# Patient Record
Sex: FEMALE | Race: WHITE | Employment: FULL TIME | URBAN - METROPOLITAN AREA
[De-identification: names, ages, dates, MRNs, and addresses within clinical notes are randomized per-mention and may not be internally consistent; named-entity substitution may affect disease eponyms.]

---

## 2021-03-17 ENCOUNTER — HOSPITAL ENCOUNTER (OUTPATIENT)
Dept: LAB | Age: 41
Discharge: HOME | End: 2021-03-17

## 2021-03-17 LAB — SENTARA SPECIMEN COL,SENBCF: NORMAL

## 2021-03-17 PROCEDURE — 99001 SPECIMEN HANDLING PT-LAB: CPT

## 2022-01-26 ENCOUNTER — HOSPITAL ENCOUNTER (OUTPATIENT)
Dept: PHYSICAL THERAPY | Age: 42
Discharge: HOME | End: 2022-01-26
Payer: COMMERCIAL

## 2022-01-26 PROCEDURE — 97110 THERAPEUTIC EXERCISES: CPT

## 2022-01-26 PROCEDURE — 97166 OT EVAL MOD COMPLEX 45 MIN: CPT

## 2022-01-26 NOTE — PROGRESS NOTES
[facility]  [address], [address] [facility] [place]  Ph [phone]  Fx [phone]    Plan of Care/Statement of Necessity for Occupational Therapy Services    Patient name: Jasmina Whittaker Start of Care: 2022   Referral source: Breefield Carmela DO : 1980    Medical Diagnosis: Left arm pain    Onset Date:    Treatment Diagnosis: Other abnormal involuntary movements [R25.8]  Tremor, unspecified [R25.1]   Prior Hospitalization: see medical history Provider#: [id number]   Medications: Verified on Patient summary List    Comorbidities: Osteoporosis, thyroid problems, kidney transplant, complete throidectomy   Prior Level of Function: Pt independent in all self care, working full time in . The Plan of Care and following information is based on the information from the initial evaluation. Assessment/ key information: Pt presents with decreased independent and performance in ADL/IADL activiites and work tasks 2/2 functional tremor, hand weakness, decreased coodination with the left hand and arm. Pt gross functional strength intact, has learned motor compensatory thumb MP hyperextension to decrease thumb tremor when thumb is in extension that is making fine motor tasks difficult and weak extensors along dorsum of hand and arm. Pt does have tremor at rest which may be presenting as essential vs funcitonal tremor however today is a \"good day\" for patient so it is hard to tell upon evaluation.  Pt may have underlying neurological impairment that is main cause, however pt would benefit from skilled OT services to work on increasing ADL independence, strength, fine motor tasks.      Patient will continue to benefit from skilled OT services to modify and progress therapeutic interventions, address functional mobility deficits, address strength deficits, analyze and cue movement patterns, analyze and modify body mechanics/ergonomics, assess and modify postural abnormalities and instruct in home and community integration to attain remaining goals. Evaluation Complexity: History MEDIUM Complexity : Expanded review of history including physical, cognitive and psychosocial  history  Examination MEDIUM Complexity : 3-5 performance deficits relating to physical, cognitive , or psychosocial skils that result in activity limitations and / or participation restrictions Clinical Decision Making MEDIUM Complexity : Patient may present with comorbidities that affect occupational performnce. Miniml to moderate modification of tasks or assistance (eg, physical or verbal ) with assesment(s) is necessary to enable patient to complete evaluation   FOTO score = an established functional score where 100 = no disability  Overall Complexity Rating: MEDIUM    Problem List: Decreased strength, Decreased coordination/prehension, Decreased ADL/functional abilities , Decreased activity tolerance, Decreased flexibility/joint mobility and Sensability   Treatment Plan may include any combination of the following: Therapeutic exercise, Therapeutic activities, Neuromuscular re-education, Physical agent/modality, Manual therapy, Patient education and ADLs/IADLs    Patient / Family readiness to learn indicated by: asking questions, trying to perform skills and interest  Persons(s) to be included in education:   patient (P)  Barriers to Learning/Limitations: None    Patient Goal (s): I want to get techniques to get better  Patient Self Reported Health Status: fair  Rehabilitation Potential: fair    ST Weeks   1. Pt will be able to perform ball rotation activity without elbow support with less than 2 drops to improve fine motor skills     Eval status: 5 drops   2. Pt will be able to improve MMT of thumb extension to 4+/5 to improve thumb stability during functional grasp  Eval status. 3/5  3.  Pt will be able to button jacket button in less than 30 seconds  with adaptive techniques and proximal support to improve ADL dressing tasks  Eval status: Takes 1 minute      LT weeks   1. Pt will be able to identify 3 adaptive techniques that assist with tremors during functional activity   Eval status: Able to name one  2. Pt will be able to 15# box and lift with proper body mechanics to mimic a functional work task with self recognition of poor form  Eval status: unaware of thumb collapse, has hard time with over 10#      3. Patient will improve FOTO score by 10 points to improve functional tolerance for ADL/IADL and work tasks. Eval Status: FOTO will be obtained  FOTO score = an established functional score where 100 = no disability    Frequency / Duration: Patient to be seen 2 times per week for 4 weeks:    Patient/ Caregiver education and instruction: Diagnosis, prognosis, self care, activity modification and exercises    Certification Period: 22-22     Farhad DAVIS, OTR/L  2022 8:00 AM    ______________________________________________________________________    I certify that the above Therapy Services are being furnished while the patient is under my care. I agree with the treatment plan and certify that this therapy is necessary.     Physician's Signature:_________________________Date:_________TIME:________                                       Ravi Talley DO      ** Signature, Date and Time must be completed for valid certification **  Please sign and return to [facility]  [address], [address] [facility] [place]  Ph [phone]  Fx [phone]

## 2022-01-26 NOTE — PROGRESS NOTES
OT DAILY TREATMENT NOTE/HAND EVAL 10-18    Patient Name: Lisa Dockery  Date:2022  : 1980  [x]  Patient  Verified  Payor: [address] [place],9D / Plan: [address] / Product Type: Managed Care Medicare /    In time:419  Out time:506  Total Treatment Time (min): 45  Visit #: 1 16    Medicare/BCBS Only   Total Timed Codes (min):  45 1:1 Treatment Time:  45       Treatment Area: Other abnormal involuntary movements [R25.8]  Tremor, unspecified [R25.1]    SUBJECTIVE  Pain Level (0-10 scale): 0/10  []constant []intermittent []improving []worsening [x]no change since onset    Any medication changes, allergies to medications, adverse drug reactions, diagnosis change, or new procedure performed?: [x] No    [] Yes (see summary sheet for update)  Subjective functional status/changes:     PLOF: Pt reports independence in self care tasks; pt is a right hand dominant  Limitations to PLOF: Tremor during functional tasks, weakness feeling during ADLs, numbness in the finger tips  Mechanism of Injury: Progresive over time, no acute incident, no falls, no surgical procedures can be linked to tremor  Current symptoms/Complaints: Tremors during functional tasks, weakness in the hands, hard time buttoning buttons, diffulty with work tasks, trouble sleeping because of resting tremors. 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  Living Situation: Lives at home with family   Pt Goals:  Ot get techniques to make tremor better  Barriers: []pain []financial [x]time []transportation []other  Motivation: Pt is highly motived  Cognition: A & O x 4    Other:    OBJECTIVE    20 min [x]Eval                  []Re-Eval       25 min Therapeutic Exercise:  [x] See flow sheet :   Rationale: increase ROM, increase strength, improve coordination and increase proprioception to improve the patients ability to perform fine motor tasks, improve dexterity, improve coordination               With   [x] TE   [] TA   [] neuro   [] other: Patient Education: [x] Review HEP    [] Progressed/Changed HEP based on:   [] positioning   [] body mechanics   [] transfers   [] heat/ice application    [] other:        General Evaluation                                                 Shoulder MMT         Right            Left  Flx                               5/5            5/5                           Ext                              5/5             5/5                         Abd                               5/5          5/5                    ER                                 5/5 4/5                   IR                                   5/5        5/5                         Tests:                      Right          Left  Tinels median           -                    -  Tinels ulnar                 -                   -  CMC grind                  -                  +                                                                                                                                                               Elbow MMT    Right             Left  Flx/Ext                 5/5            5/5                                  Pro                      5/5              5/5                   Sup                                                                   Wrist AROM       Right           Left   Flx                          5/5            5/5                              Ext                          5/5            3+5                               UD                         5/5            5/5                               RD                       5/5              5/5                                                                                                Finger:  WNL                                                                                                        Right                  Left  1st                         80 60                                                      2nd                         65                                        60                3rd               65                      60                     Pinch     Right          Left  3pt            19              16                        2pt             16            18                                                                             Sensation: Pts has previous neuropathy symptoms, has negative tinels but does has pin prick sensation loss through median nerve distribution. Level 3.36 on semmes jazmín filament     Observation:   Pt does have tremor at rest and with function primarily impacting the thumb and index finger. Pt has CMC thumb collapse compensatory pattern with fine motor tasks with weak extensors. Resting position of wrist in slight flexion with finger extension attempting to compensate. During fine motor tasks pt initates motion at tumb MP   Palpation:   No pain with palpation through forearm, no muscle wasting in thenar or hypothenar regions.        Nine-Hole Peg Test:  Left= _24.5____seconds  Right=_29.5____seconds    ADLs  Feeding:        []MaxA   []ModA   []Frank   [] CGA   []SBA   [x]Yfn   []Independent  UE Dressing:       []MaxA   []ModA   []Frank   [] CGA   []SBA   []Yfn   [x]Independent  LE Dressing:       []MaxA   []ModA   []Frank   [] CGA   []SBA   []Yfn   [x]Independent  Grooming:       []MaxA   []ModA   []Frank   [] CGA   []SBA   []Yfn   [x]Independent  Toileting:       []MaxA   []ModA   []Frank   [] CGA   []SBA   []Yfn   [x]Independent  Bathing:       []MaxA   []ModA   []Frank   [] CGA   []SBA   []Yfn   [x]Independent  Light Meal Prep:    []MaxA   []ModA   []Frank   [] CGA   []SBA   [x]Yfn   []Independent  Household/Other: []MaxA   []ModA   []Frank   [] CGA   []SBA   [x]Yfn   []Independent  Adaptive Equip:     []MaxA   []ModA   []Frank   [] CGA   []SBA   []Yfn [x]Independent  Driving:       []MaxA   []ModA   []Frank   [] CGA   []SBA   []Yfn   [x]Independent  Money Mgmt:        []MaxA   []ModA   []Frank   [] CGA   []SBA   []Yfn   [x]Independent    Pain Level (0-10 scale) post treatment: 0/10    ASSESSMENT/Changes in Function: Pt presents with decreased independent and performance in ADL/IADL activiites and work tasks 2/2 functional tremor, hand weakness, decreased coodination with the left hand and arm. Pt gross functional strength intact, has learned motor compensatory thumb MP hyperextension to decrease thumb tremor when thumb is in extension that is making fine motor tasks difficult and weak extensors along dorsum of hand and arm. Pt does have tremor at rest which may be presenting as essential vs funcitonal tremor however today is a \"good day\" for patient so it is hard to tell upon evaluation. Pt may have underlying neurological impairment that is main cause, however pt would benefit from skilled OT services to work on increasing ADL independence, strength, fine motor tasks. Patient will continue to benefit from skilled OT services to modify and progress therapeutic interventions, address functional mobility deficits, address strength deficits, analyze and cue movement patterns, analyze and modify body mechanics/ergonomics, assess and modify postural abnormalities and instruct in home and community integration to attain remaining goals. [x]  See Plan of Care  []  See progress note/recertification  []  See Discharge Summary         Goals   ST Weeks   1. Pt will be able to perform ball rotation activity without elbow support with less than 2 drops to improve fine motor skills     Eval status: 5 drops   2. Pt will be able to improve MMT of thumb extension to 4+/5 to improve thumb stability during functional grasp  Eval status. 3/5  3.  Pt will be able to button jacket button in less than 30 seconds  with adaptive techniques and proximal support to improve ADL dressing tasks  Eval status: Takes 1 minute     LT weeks   1. Pt will be able to identify 3 adaptive techniques that assist with tremors during functional activity   Eval status: Able to name one  2. Pt will be able to 15# box and lift with proper body mechanics to mimic a functional work task with self recognition of poor form  Eval status: unaware of thumb collapse, has hard time with over 10#     3. Patient will improve FOTO score by 10 points to improve functional tolerance for ADL/IADL and work tasks.   Eval Status: FOTO will be obtained  FOTO score = an established functional score where 100 = no disability      PLAN  [x]  Upgrade activities as tolerated     [x]  Continue plan of care  []  Update interventions per flow sheet       []  Discharge due to:_  []  Other:_      Kirsten DAVIS, OTR/L  2022  8:00 AM

## 2022-02-02 ENCOUNTER — HOSPITAL ENCOUNTER (OUTPATIENT)
Dept: PHYSICAL THERAPY | Age: 42
Discharge: HOME | End: 2022-02-02
Payer: COMMERCIAL

## 2022-02-02 PROCEDURE — 97110 THERAPEUTIC EXERCISES: CPT

## 2022-02-02 PROCEDURE — 97112 NEUROMUSCULAR REEDUCATION: CPT

## 2022-02-02 PROCEDURE — 97530 THERAPEUTIC ACTIVITIES: CPT

## 2022-02-02 NOTE — PROGRESS NOTES
OT DAILY TREATMENT NOTE - [facility]     Patient Name: Benny Sheppard  Date:2022  : 1980  [x]  Patient  Verified  Payor: Jerald Mistry / Plan: [facility] [address] / Product Type: Managed Care Medicare /    In time:540  Out time:635  Total Treatment Time (min): 55  Total Timed Codes (min): 55  1:1 Treatment Time ([address] only): 55   Visit #: 2 of 8    Treatment Area: Other abnormal involuntary movements [R25.8]  Tremor, unspecified [R25.1]    SUBJECTIVE  Pain Level (0-10 scale): 3/10  Any medication changes, allergies to medications, adverse drug reactions, diagnosis change, or new procedure performed?: [x] No    [] Yes (see summary sheet for update)  Subjective functional status/changes:   [] No changes reported  Pt report noticing thumb position more throughout the week, was able to decrease compensatory pattern.      OBJECTIVE        25 min Therapeutic Exercise:  [x] See flow sheet :   Rationale: increase strength to improve the patients ability to proximal strength in the forearm and elbow and shoulder     18 min Therapeutic Activity:  [x]  See flow sheet :   Rationale: improve coordination and improve balance  to improve the patients ability to improve thumb stability during functional tasks      12 min Neuromuscular Re-education:  [x]  See flow sheet :   Rationale: increase proprioception  to improve the patients ability to decrease neural tension through thumb, fingers, and shoulder/upper extremity          With   [x] TE   [] TA   [] neuro   [] other: Patient Education: [x] Review HEP    [x] Progressed/Changed HEP based on:   [x] positioning   [] body mechanics   [] transfers   [] heat/ice application   [] Splint wear/care   [] Sensory re-education   [] scar management      [] other:                  Pain Level (0-10 scale) post treatment: 0/10    ASSESSMENT/Changes in Function: Pt made improvements over the past week on thumb positioning during functional  and grasp, has better IP flexion during grasping patterns and extension patterns of the thumb. Addition of proximal strengthening with scapular strengthening tolerated well, also addiction of neural gliding to address median nerve irritation. Pt remains a good candidate for skilled OT services to address functional tremors and decrease pain and tingling in the hands. Patient will continue to benefit from skilled OT services to modify and progress therapeutic interventions, address functional mobility deficits, address ROM deficits, address strength deficits, analyze and address soft tissue restrictions, analyze and cue movement patterns, analyze and modify body mechanics/ergonomics and assess and modify postural abnormalities to attain remaining goals. [x]  See Plan of Care  []  See progress note/recertification  []  See Discharge Summary         Progress towards goals / Updated goals:  ST Weeks   1. Pt will be able to perform ball rotation activity without elbow support with less than 2 drops to improve fine motor skills     Eval status: 5 drops   2. Pt will be able to improve MMT of thumb extension to 4+/5 to improve thumb stability during functional grasp  Eval status.  3/5  3. Pt will be able to button jacket button in less than 30 seconds  with adaptive techniques and proximal support to improve ADL dressing tasks  Eval status: Takes 1 minute      LT weeks   1. Pt will be able to identify 3 adaptive techniques that assist with tremors during functional activity   Eval status: Able to name one  2. Pt will be able to 15# box and lift with proper body mechanics to mimic a functional work task with self recognition of poor form  Eval status: unaware of thumb collapse, has hard time with over 10#      3. Patient will improve FOTO score by 10 points to improve functional tolerance for ADL/IADL and work tasks.   Eval Status: FOTO 53  FOTO score = an established functional score where 100 = no disability    PLAN  [x] Upgrade activities as tolerated     [x]  Continue plan of care  []  Update interventions per flow sheet       []  Discharge due to:_  []  Other:_      Blair Ramey OTD, OTR/L  2/2/2022  8:02 AM    Future Appointments   Date Time Provider Erik Tsang   2/2/2022  5:45 PM Maycol Jackson [facility]   2/4/2022  8:00 AM [facility]   2/8/2022  8:45 AM Maycol Jackson [facility]   2/10/2022  8:00 AM [facility]   2/17/2022  8:00 AM [facility]   2/18/2022  8:45 AM [facility]   2/23/2022  5:45 PM [facility]   2/25/2022  8:45 AM Maycol Jackson [facility]

## 2022-02-04 ENCOUNTER — APPOINTMENT (OUTPATIENT)
Dept: PHYSICAL THERAPY | Age: 42
End: 2022-02-04
Payer: COMMERCIAL

## 2022-02-08 ENCOUNTER — HOSPITAL ENCOUNTER (OUTPATIENT)
Dept: PHYSICAL THERAPY | Age: 42
Discharge: HOME | End: 2022-02-08
Payer: COMMERCIAL

## 2022-02-08 PROCEDURE — 97112 NEUROMUSCULAR REEDUCATION: CPT

## 2022-02-08 PROCEDURE — 97530 THERAPEUTIC ACTIVITIES: CPT

## 2022-02-08 PROCEDURE — 97110 THERAPEUTIC EXERCISES: CPT

## 2022-02-08 NOTE — PROGRESS NOTES
OT DAILY TREATMENT NOTE - [facility]     Patient Name: Leandro Zepeda  Date:2022  : 1980  [x]  Patient  Verified  Payor: Jay [employer]ocks / Plan: [employer]/POS / Product Type: HMO /    In time:834  Out time: 930    Total Treatment Time (min): 56  Total Timed Codes (min): 56  1:1 Treatment Time ([address] only): 56   Visit #: 3 of 8    Treatment Area: Other abnormal involuntary movements [R25.8]  Tremor, unspecified [R25.1]    SUBJECTIVE  Pain Level (0-10 scale): 3/10  Any medication changes, allergies to medications, adverse drug reactions, diagnosis change, or new procedure performed?: [x] No    [] Yes (see summary sheet for update)  Subjective functional status/changes:   [] No changes reported  Pt reports no soreness following last session    OBJECTIVE      30 min Therapeutic Exercise:  [x] See flow sheet :   Rationale: increase ROM and increase strength to improve the patients ability to proximal strength in the forearm and elbow and shoulder     18 min Therapeutic Activity:  [x]  See flow sheet :   Rationale: increase ROM and increase strength  to improve the patients ability to improve thumb stability during functional tasks      8 min Neuromuscular Re-education:  [x]  See flow sheet :   Rationale: increase ROM and increase strength  to improve the patients ability to neural tension through thumb, fingers, and shoulder/upper extremity     With   [x] TE   [] TA   [] neuro   [] other: Patient Education: [x] Review HEP    [] Progressed/Changed HEP based on:   [] positioning   [] body mechanics   [] transfers   [] heat/ice application   [] Splint wear/care   [] Sensory re-education   [] scar management      [] other:         Pain Level (0-10 scale) post treatment: 2/10    ASSESSMENT/Changes in Function: Pt doing well, had no soreness with advancement of activity today.  Upgraded fine motor pinching/control tasks today, pt does have noted tremors when getting closer to placing peg into hole, proximal stability does not seem to effect rate of tremor at forearm or elbow level at the moment. Continue with verbal cues to prevent cmc hyperextension for better support using thenar muscles. Better picture given so that pt can better follow median nerve glides as she has trouble with instructions. Patient will continue to benefit from skilled OT services to modify and progress therapeutic interventions, address functional mobility deficits, address ROM deficits, address strength deficits, analyze and address soft tissue restrictions, analyze and cue movement patterns and analyze and modify body mechanics/ergonomics to attain remaining goals. [x]  See Plan of Care  []  See progress note/recertification  []  See Discharge Summary         Progress towards goals / Updated goals:  ST Weeks   1. Pt will be able to perform ball rotation activity without elbow support with less than 2 drops to improve fine motor skills     Eval status: 5 drops   2. Pt will be able to improve MMT of thumb extension to 4+/5 to improve thumb stability during functional grasp  Eval status.  3/5  3. Pt will be able to button jacket button in less than 30 seconds  with adaptive techniques and proximal support to improve ADL dressing tasks  Eval status: Takes 1 minute      LT weeks   1. Pt will be able to identify 3 adaptive techniques that assist with tremors during functional activity   Eval status: Able to name one  2. Pt will be able to 15# box and lift with proper body mechanics to mimic a functional work task with self recognition of poor form  Eval status: unaware of thumb collapse, has hard time with over 10#      3. Patient will improve FOTO score by 10 points to improve functional tolerance for ADL/IADL and work tasks.   Eval Status: FOTO 53  FOTO score = an established functional score where 100 = no disability    PLAN  [x]  Upgrade activities as tolerated     [x]  Continue plan of care  [x]  Update interventions per flow sheet       []  Discharge due to:_  []  Other:_      Albertina Martinez OTARTHUR, OTR/L  2/8/2022  7:51 AM    Future Appointments   Date Time Provider Erik Tsang   2/8/2022  8:45 AM Aruna Valdovinos [facility]   2/10/2022  8:00 AM [facility]   2/17/2022  8:00 AM [facility]   2/18/2022  8:45 AM [facility]   2/23/2022  5:45 PM [facility]   2/25/2022  8:45 AM Aruna Valdovinos [facility]

## 2022-02-10 ENCOUNTER — HOSPITAL ENCOUNTER (OUTPATIENT)
Dept: PHYSICAL THERAPY | Age: 42
Discharge: HOME | End: 2022-02-10
Payer: COMMERCIAL

## 2022-02-10 PROCEDURE — 97530 THERAPEUTIC ACTIVITIES: CPT

## 2022-02-10 PROCEDURE — 97110 THERAPEUTIC EXERCISES: CPT

## 2022-02-10 NOTE — PROGRESS NOTES
OT DAILY TREATMENT NOTE - [facility]     Patient Name: Leandro Zepeda  Date:2/10/2022  : 1980  [x]  Patient  Verified  Payor: Jay Socks / Plan: [facility] [employer] HMO/CHOICE PLUS/POS / Product Type: HMO /    In time:821  Out time:915  Total Treatment Time (min): 56  Total Timed Codes (min): 56  1:1 Treatment Time ([address] only): 56   Visit #: 4 of 8    Treatment Area: Other abnormal involuntary movements [R25.8]  Tremor, unspecified [R25.1]    SUBJECTIVE  Pain Level (0-10 scale): 7/10  Any medication changes, allergies to medications, adverse drug reactions, diagnosis change, or new procedure performed?: [x] No    [] Yes (see summary sheet for update)  Subjective functional status/changes:   [] No changes reported  PT reports neuropathy pain is bad today, reports thumb is not increase in shaking however pain is not feeling great. OBJECTIVE    29 min Therapeutic Exercise:  [x] See flow sheet :   Rationale: increase ROM and increase strength to improve the patients ability to proximal strength in the forearm and elbow and shoulder     27 min Therapeutic Activity:  [x]  See flow sheet :   Rationale: increase ROM, increase strength, improve coordination and increase proprioception  to improve the patients ability to improve thumb stability during functional tasks            With   [x] TE   [] TA   [] neuro   [] other: Patient Education: [x] Review HEP    [x] Progressed/Changed HEP based on:   [] positioning   [] body mechanics   [] transfers   [] heat/ice application   [] Splint wear/care   [] Sensory re-education   [] scar management      [] other:                Pain Level (0-10 scale) post treatment: 6/10    ASSESSMENT/Changes in Function: Pt doing well and demonstrated decrease tremor during pinch activities with peg placement and pt beginning to have increased confidence. Neuropathy pain continues to fluctuate day to day.  Upgraded pinching activities at home, pt doing better with IP flexion during functional tasks. Patient will continue to benefit from skilled OT services to modify and progress therapeutic interventions, address functional mobility deficits, address ROM deficits, address strength deficits, analyze and address soft tissue restrictions, analyze and cue movement patterns and analyze and modify body mechanics/ergonomics to attain remaining goals. Patient will continue to benefit from skilled OT services to modify and progress therapeutic interventions, address functional mobility deficits, address ROM deficits, address strength deficits, analyze and address soft tissue restrictions, analyze and cue movement patterns, analyze and modify body mechanics/ergonomics and assess and modify postural abnormalities to attain remaining goals. [x]  See Plan of Care  []  See progress note/recertification  []  See Discharge Summary         Progress towards goals / Updated goals:  ST Weeks   1. Pt will be able to perform ball rotation activity without elbow support with less than 2 drops to improve fine motor skills     Eval status: 5 drops   Current:   2. Pt will be able to improve MMT of thumb extension to 4+/5 to improve thumb stability during functional grasp  Eval status.  3/5   Current:   3. Pt will be able to button jacket button in less than 30 seconds  with adaptive techniques and proximal support to improve ADL dressing tasks  Eval status: Takes 1 minute    Current:    LT weeks   1. Pt will be able to identify 3 adaptive techniques that assist with tremors during functional activity   Eval status: Able to name one   Current:   2. Pt will be able to 15# box and lift with proper body mechanics to mimic a functional work task with self recognition of poor form  Eval status: unaware of thumb collapse, has hard time with over 10#     Current:   3. Patient will improve FOTO score by 10 points to improve functional tolerance for ADL/IADL and work tasks.   Eval Status: FOTO 53  FOTO score = an established functional score where 100 = no disability  PLAN  [x]  Upgrade activities as tolerated     [x]  Continue plan of care  []  Update interventions per flow sheet       []  Discharge due to:_  []  Other:_      Edi Galvan OTD, OTR/L  2/10/2022  7:49 AM    Future Appointments   Date Time Provider Erik Tsang   2/10/2022  8:00 AM Aljunaid Orellana [facility]   2/17/2022  8:00 AM Aljunaid Orellana [facility]   2/18/2022  8:45 AM [facility]   2/23/2022  5:45 PM [facility]   2/25/2022  8:45 AM Sandrine Orellana [facility]

## 2022-02-17 ENCOUNTER — HOSPITAL ENCOUNTER (OUTPATIENT)
Dept: PHYSICAL THERAPY | Age: 42
Discharge: HOME | End: 2022-02-17
Payer: COMMERCIAL

## 2022-02-17 PROCEDURE — 97530 THERAPEUTIC ACTIVITIES: CPT

## 2022-02-17 PROCEDURE — 97110 THERAPEUTIC EXERCISES: CPT

## 2022-02-17 NOTE — PROGRESS NOTES
OT DAILY TREATMENT NOTE - [facility]     Patient Name: Sangeetha Zavala  Date:2022  : 1980  [x]  Patient  Verified  Payor: Alfonso Cruzs / Plan: [employer] HMO/CHOICE PLUS/POS / Product Type: HMO /    In time:758  Out time:845  Total Treatment Time (min): 47  Total Timed Codes (min): 47  1:1 Treatment Time ( [address] only): 47   Visit #: 5 of 8    Treatment Area: Other abnormal involuntary movements [R25.8]  Tremor, unspecified [R25.1]    SUBJECTIVE  Pain Level (0-10 scale): 5/10  Any medication changes, allergies to medications, adverse drug reactions, diagnosis change, or new procedure performed?: [x] No    [] Yes (see summary sheet for update)  Subjective functional status/changes:   [] No changes reported  Pt reports having some \"jerks\" in the night making it hard to sleep, reports compliance with exercises and working on thumb stablily. OBJECTIVE       14 min Therapeutic Exercise:  [x] See flow sheet :   Rationale: increase ROM, increase strength and improve coordination to improve the patients ability to proximal strength in the forearm and elbow and shoulder     33 min Therapeutic Activity:  [x]  See flow sheet :   Rationale: increase ROM, increase strength and improve coordination  to improve the patients ability to improve the patients ability to improve thumb stability during functional tasks      With   [x] TE   [] TA   [] neuro   [] other: Patient Education: [x] Review HEP    [x] Progressed/Changed HEP based on:   [] positioning   [] body mechanics   [] transfers   [] heat/ice application   [] Splint wear/care   [] Sensory re-education   [] scar management      [] other:                Pain Level (0-10 scale) post treatment: 4/10    ASSESSMENT/Changes in Function: Pt steadily progressing, main limitation currently seems to be pain due to her neuropathy which she has been fatiguing for her.  Proximal strengthening of shoulders and forearm are decreasing functional tremors of the thumb, and increasing strength of thumb cmc stabilizers seems to have made peg placement against resistance better. Needs some cuing to bring arm into side or to rest on table when tremors increase. Patient will continue to benefit from skilled OT services to modify and progress therapeutic interventions, address functional mobility deficits, address ROM deficits, address strength deficits, analyze and address soft tissue restrictions, analyze and cue movement patterns, analyze and modify body mechanics/ergonomics and assess and modify postural abnormalities to attain remaining goals. [x]  See Plan of Care  []  See progress note/recertification  []  See Discharge Summary         Progress towards goals / Updated goals:  ST Weeks   1. Pt will be able to perform ball rotation activity without elbow support with less than 2 drops to improve fine motor skills     Eval status: 5 drops   Current:   2. Pt will be able to improve MMT of thumb extension to 4+/5 to improve thumb stability during functional grasp  Eval status.  3/5   Current:   3. Pt will be able to button jacket button in less than 30 seconds  with adaptive techniques and proximal support to improve ADL dressing tasks  Eval status: Takes 1 minute    Current:    LT weeks   1. Pt will be able to identify 3 adaptive techniques that assist with tremors during functional activity   Eval status: Able to name one   Current:   2. Pt will be able to 15# box and lift with proper body mechanics to mimic a functional work task with self recognition of poor form  Eval status: unaware of thumb collapse, has hard time with over 10#     Current:   3. Patient will improve FOTO score by 10 points to improve functional tolerance for ADL/IADL and work tasks.   Eval Status: FOTO 53  FOTO score = an established functional score where 100 = no disability    PLAN  [x]  Upgrade activities as tolerated     [x]  Continue plan of care  []  Update interventions per flow sheet       []  Discharge due to:_  []  Other:_      Ceasar Soriano OTD, OTR/L  2/17/2022  7:44 AM    Future Appointments   Date Time Provider Erik Tsang   2/17/2022  8:00 AM Cameron Aragon [facility]   2/18/2022  8:45 AM [facility]   2/23/2022  5:45 PM [facility]   2/25/2022  8:45 AM Cameron Aragon [facility]

## 2022-02-18 ENCOUNTER — HOSPITAL ENCOUNTER (OUTPATIENT)
Dept: PHYSICAL THERAPY | Age: 42
Discharge: HOME | End: 2022-02-18
Payer: COMMERCIAL

## 2022-02-18 PROCEDURE — 97110 THERAPEUTIC EXERCISES: CPT

## 2022-02-18 PROCEDURE — 97530 THERAPEUTIC ACTIVITIES: CPT

## 2022-02-18 NOTE — PROGRESS NOTES
OT DAILY TREATMENT NOTE - [facility]     Patient Name: Anuel Jacome  Date:2022  : 1980  [x]  Patient  Verified  Payor: Lul Mccormack / Plan: [facility] HMO/CHOICE PLUS/POS / Product Type: HMO /    In time:843  Out time:930  Total Treatment Time (min): 47  Total Timed Codes (min): 47  1:1 Treatment Time ( [address] only): 47   Visit #: 6 of 8    Treatment Area: Other abnormal involuntary movements [R25.8]  Tremor, unspecified [R25.1]    SUBJECTIVE  Pain Level (0-10 scale): 0/10  Any medication changes, allergies to medications, adverse drug reactions, diagnosis change, or new procedure performed?: [x] No    [] Yes (see summary sheet for update)  Subjective functional status/changes:   [] No changes reported  Pt reports being minor sore in the thumb following yesterdays execises   OBJECTIVE        12 min Therapeutic Exercise:  [x] See flow sheet :   Rationale: increase ROM, increase strength and improve coordination to improve the patients ability to patients ability to proximal strength in the forearm and elbow and shoulder     35 min Therapeutic Activity:  [x]  See flow sheet :   Rationale: increase ROM, increase strength and improve coordination  to improve the patients ability to patients ability to improve thumb stability during functional tasks      With   [x] TE   [] TA   [] neuro   [] other: Patient Education: [x] Review HEP    [x] Progressed/Changed HEP based on:   [] positioning   [] body mechanics   [] transfers   [] heat/ice application   [] Splint wear/care   [] Sensory re-education   [] scar management      [] other:                 Pain Level (0-10 scale) post treatment: 0/10    ASSESSMENT/Changes in Function: Pt doing well, no adverse effect from yesterday session. Focus on fine motor placement and thumb flexion strengthening today. Upgraded pinch activities today with no drops from patient even with upgraded resistance. Continue to work on proximal stability and strengthening leading to increased distal control. Pt remains a good candidate for skilled OT services. Patient will continue to benefit from skilled OT services to modify and progress therapeutic interventions, address functional mobility deficits, address ROM deficits, address strength deficits, analyze and address soft tissue restrictions, analyze and cue movement patterns, analyze and modify body mechanics/ergonomics and assess and modify postural abnormalities to attain remaining goals. [x]  See Plan of Care  []  See progress note/recertification  []  See Discharge Summary         Progress towards goals / Updated goals:  ST Weeks   1. Pt will be able to perform ball rotation activity without elbow support with less than 2 drops to improve fine motor skills     Eval status: 5 drops   Current: 0 drops  2. Pt will be able to improve MMT of thumb extension to 4+/5 to improve thumb stability during functional grasp  Eval status.  3/5   Current:   3. Pt will be able to button jacket button in less than 30 seconds  with adaptive techniques and proximal support to improve ADL dressing tasks  Eval status: Takes 1 minute    Current:    LT weeks   1. Pt will be able to identify 3 adaptive techniques that assist with tremors during functional activity   Eval status: Able to name one   Current:  Able to name 3   2. Pt will be able to 15# box and lift with proper body mechanics to mimic a functional work task with self recognition of poor form  Eval status: unaware of thumb collapse, has hard time with over 10#     Current:   3. Patient will improve FOTO score by 10 points to improve functional tolerance for ADL/IADL and work tasks.   Eval Status: FOTO 53  FOTO score = an established functional score where 100 = no disability     PLAN  [x]  Upgrade activities as tolerated     [x]  Continue plan of care  []  Update interventions per flow sheet       []  Discharge due to:_  []  Other:_      Cassi Crowe OTD, OTR/L 2/18/2022  7:42 AM    Future Appointments   Date Time Provider Erik Tsang   2/18/2022  8:45 AM [facility]   2/23/2022  5:45 PM [facility]   2/25/2022  8:45 AM Alysha Elizondo THE [facility]

## 2022-02-23 ENCOUNTER — HOSPITAL ENCOUNTER (OUTPATIENT)
Dept: PHYSICAL THERAPY | Age: 42
Discharge: HOME | End: 2022-02-23
Payer: COMMERCIAL

## 2022-02-23 PROCEDURE — 97530 THERAPEUTIC ACTIVITIES: CPT

## 2022-02-23 PROCEDURE — 97110 THERAPEUTIC EXERCISES: CPT

## 2022-02-23 NOTE — PROGRESS NOTES
OT DAILY TREATMENT NOTE - [facility]     Patient Name: Ana Swift  Date:2022  : 1980  [x]  Patient  Verified  Payor: Zulma Driscoll / Plan: [employer] HMO/CHOICE PLUS/POS / Product Type: HMO /    In time:545  Out time:630  Total Treatment Time (min): 45  Total Timed Codes (min): 45  1:1 Treatment Time ([address] only): 45  Visit #: 7 of 8    Treatment Area: Other abnormal involuntary movements [R25.8]  Tremor, unspecified [R25.1]    SUBJECTIVE  Pain Level (0-10 scale): 3/10  Any medication changes, allergies to medications, adverse drug reactions, diagnosis change, or new procedure performed?: [x] No    [x] Yes (see summary sheet for update)  Subjective functional status/changes:   [] No changes reported  Pt reports Saturday was the first day she has not had tremors in over a year and was extremely excited. Has had less issues over the weekend as well     OBJECTIVE      13 min Therapeutic Exercise:  [x] See flow sheet :   Rationale: increase ROM, increase strength and improve coordination to improve the patients ability to proximal strength in the forearm and elbow and shoulder     32 min Therapeutic Activity:  [x]  See flow sheet :   Rationale: increase ROM, increase strength and improve coordination  to improve the patients ability to improve thumb stability during functional tasks           With   [x] TE   [] TA   [] neuro   [] other: Patient Education: [x] Review HEP    [] Progressed/Changed HEP based on:   [] positioning   [] body mechanics   [] transfers   [] heat/ice application   [] Splint wear/care   [] Sensory re-education   [] scar management      [] other:             Pain Level (0-10 scale) post treatment: 0/10    ASSESSMENT/Changes in Function: Pt has progressed well and this is first day where she reports she has not had extreme functional shakes with left hand since Saturday.  Pt is highly excited and is performing resistive fine motor tasks in clinic setting with decreased stress, increased timing, decreased drops. We continue to work on combination or proximal strengthening with fine motor control and stretching to address full body. Patient will continue to benefit from skilled OT services to modify and progress therapeutic interventions, address functional mobility deficits, address ROM deficits, address strength deficits, analyze and address soft tissue restrictions, analyze and cue movement patterns, analyze and modify body mechanics/ergonomics and assess and modify postural abnormalities to attain remaining goals. [x]  See Plan of Care  []  See progress note/recertification  []  See Discharge Summary         Progress towards goals / Updated goals:  ST Weeks   1. Pt will be able to perform ball rotation activity without elbow support with less than 2 drops to improve fine motor skills     Eval status: 5 drops   Current: 0 drops Goal met   2. Pt will be able to improve MMT of thumb extension to 4+/5 to improve thumb stability during functional grasp  Eval status.  3/5   Current:  5/5 Goal met   3. Pt will be able to button jacket button in less than 30 seconds  with adaptive techniques and proximal support to improve ADL dressing tasks  Eval status: Takes 1 minute    Current:  Able to perform with let hand  LT weeks   1. Pt will be able to identify 3 adaptive techniques that assist with tremors during functional activity   Eval status: Able to name one   Current:  Able to name 3  Goal met   2. Pt will be able to 15# box and lift with proper body mechanics to mimic a functional work task with self recognition of poor form  Eval status: unaware of thumb collapse, has hard time with over 10#     Current: Can perform with a kettle bell 15#, box still difficult. 3. Patient will improve FOTO score by 10 points to improve functional tolerance for ADL/IADL and work tasks.   Eval Status: FOTO 53  Current: 70   FOTO score = an established functional score where 100 = no disability    PLAN  [x]  Upgrade activities as tolerated     [x]  Continue plan of care  []  Update interventions per flow sheet       []  Discharge due to:_  []  Other:_      Anuj Morgan OTD, OTR/L  2/23/2022  7:52 AM    Future Appointments   Date Time Provider Erik Tsang   2/23/2022  5:45 PM Duenas Michaela [facility]   2/25/2022  8:45 AM [facility]

## 2022-02-23 NOTE — PROGRESS NOTES
[facility]  [address], [address] [facility] [place]  Ph [phone]  Fx [phone]      Medicare Progress Report    Patient name: Olegario Bruner     Start of Care: 2022  Referral source: Ravi Talley DO    : 1980  Medical/Treatment Diagnosis: Other abnormal involuntary movements [R25.8]  Tremor, unspecified [R25.1]  Onset Date:  Prior Hospitalization: see medical history   Provider#: [id number]  Comorbidities: Osteoporosis, thyroid problems, kidney transplant, complete throidectomy  Prior Level of Function:Pt independent in all self care, working full time in . Medications: Verified on Patient Summary List    Visits from Start of Care: 7    Missed Visits: 0  Reporting Period : 22 to 22    Subjective Reports: Pt reports Saturday was the first day she has not had tremors in over a year and was extremely excited. Has had less issues over the weekend as well     Goals:  ST Weeks   1. Pt will be able to perform ball rotation activity without elbow support with less than 2 drops to improve fine motor skills     Eval status: 5 drops   Current: 0 drops Goal met   2. Pt will be able to improve MMT of thumb extension to 4+/5 to improve thumb stability during functional grasp  Eval status.  3/5   Current:  5/5 Goal met   3. Pt will be able to button jacket button in less than 30 seconds  with adaptive techniques and proximal support to improve ADL dressing tasks  Eval status: Takes 1 minute    Current:  Able to perform with let hand  LT weeks   1. Pt will be able to identify 3 adaptive techniques that assist with tremors during functional activity   Eval status: Able to name one   Current:  Able to name 3  Goal met   2.  Pt will be able to 15# box and lift with proper body mechanics to mimic a functional work task with self recognition of poor form  Eval status: unaware of thumb collapse, has hard time with over 10#     Current: Can perform with a laurel bell 15#, box still difficult. 3. Patient will improve FOTO score by 10 points to improve functional tolerance for ADL/IADL and work tasks. Eval Status: FOTO 53  Current: 70   FOTO score = an established functional score where 100 = no disability  Key functional changes: Pt reports less shaking with active tremor, decreased stress, increased fine motor control and support       Assessment / Recommendations: Pt has progressed well and this is first day where she reports she has not had extreme functional shakes with left hand since Saturday. Pt is highly excited and is performing resistive fine motor tasks in clinic setting with decreased stress, increased timing, decreased drops. We continue to work on combination or proximal strengthening with fine motor control and stretching to address full body.      Patient will continue to benefit from skilled OT services to modify and progress therapeutic interventions, address functional mobility deficits, address ROM deficits, address strength deficits, analyze and address soft tissue restrictions, analyze and cue movement patterns, analyze and modify body mechanics/ergonomics and assess and modify postural abnormalities to attain remaining goals. Problem List: Pain effecting function, Decreased strength, Decreased coordination/prehension, Decreased ADL/functional abilities , Decreased activity tolerance and Decreased flexibility/joint mobility   Treatment Plan: Therapeutic exercise, Therapeutic activities, Neuromuscular re-education, Manual therapy, Splinting/orthoses, Patient education and ADLs/IADLs    Patient Goal (s) has been updated and includes:   1.  Pt will be able to place 10 marbles using clips and key pinch with on drops to improve thumb strength and stability  Eval: 4 drops      Updated Goals to be accomplished in 4 weeks:      Frequency / Duration: Patient to be seen 2 times per week for 4 weeks:      Alejandro DAVIS, OTR/L  2/23/2022 7:53 AM

## 2022-02-25 ENCOUNTER — HOSPITAL ENCOUNTER (OUTPATIENT)
Dept: PHYSICAL THERAPY | Age: 42
Discharge: HOME | End: 2022-02-25
Payer: COMMERCIAL

## 2022-02-25 PROCEDURE — 97530 THERAPEUTIC ACTIVITIES: CPT

## 2022-02-25 PROCEDURE — 97110 THERAPEUTIC EXERCISES: CPT

## 2022-02-25 NOTE — PROGRESS NOTES
OT DAILY TREATMENT NOTE - [facility]     Patient Name: Becki Woodard  Date:2022  : 1980  [x]  Patient  Verified   Payor: Ruth Ann Roque / Plan: [facility] HMO/CHOICE PLUS/POS / Product Type: HMO /    In time:835  Out time:925  Total Treatment Time (min): 50  Total Timed Codes (min): 50  1:1 Treatment Time ([address] only): 50   Visit #: 8 of 8    Treatment Area: Other abnormal involuntary movements [R25.8]  Tremor, unspecified [R25.1]    SUBJECTIVE  Pain Level (0-10 scale): 0/10  Any medication changes, allergies to medications, adverse drug reactions, diagnosis change, or new procedure performed?: [x] No    [] Yes (see summary sheet for update)  Subjective functional status/changes:   [] No changes reported  Pt reports shaking continues to stave off, no issues since Wednesday     OBJECTIVE    20 min Therapeutic Exercise:  [x] See flow sheet :   Rationale: increase ROM and increase strength to improve the patients ability to proximal strength in the forearm and elbow and shoulder        30 min Therapeutic Activity:  [x]  See flow sheet :   Rationale: increase ROM, increase strength and improve coordination  to improve the patients ability to improve thumb stability during functional tasks         With   [x] TE   [] TA   [] neuro   [] other: Patient Education: [x] Review HEP    [] Progressed/Changed HEP based on:   [] positioning   [] body mechanics   [] transfers   [] heat/ice application   [] Splint wear/care   [] Sensory re-education   [] scar management      [] other:        Pain Level (0-10 scale) post treatment: 0/10     ASSESSMENT/Changes in Function: Pt continues to have limited shaking in the hand over the past 2 weeks that is improving fine motor skills, manipulation of small objects, and strength. Upgraded fine motor tasks and prehensile pinch and manipulation/translation tasks which pt continues to have decreased active tremor during fine motor tasks.  We are going to decrease therapy to 1x/week over the next 2 weeks with plan to d/c to HEP following. Patient will continue to benefit from skilled OT services to modify and progress therapeutic interventions, address functional mobility deficits, address ROM deficits, address strength deficits, analyze and address soft tissue restrictions, analyze and cue movement patterns, analyze and modify body mechanics/ergonomics and assess and modify postural abnormalities to attain remaining goals. [x]  See Plan of Care  []  See progress note/recertification  []  See Discharge Summary         Progress towards goals / Updated goals:  ST Weeks   1. Pt will be able to perform ball rotation activity without elbow support with less than 2 drops to improve fine motor skills     Eval status: 5 drops   Current: 0 drops Goal met   2. Pt will be able to improve MMT of thumb extension to 4+/5 to improve thumb stability during functional grasp  Eval status.  3/5   Current:  5/5 Goal met   3. Pt will be able to button jacket button in less than 30 seconds  with adaptive techniques and proximal support to improve ADL dressing tasks  Eval status: Takes 1 minute    Current:  Able to perform with let hand goal met   LT weeks   1. Pt will be able to identify 3 adaptive techniques that assist with tremors during functional activity   Eval status: Able to name one   Current:  Able to name 3  Goal met   2. Pt will be able to 15# box and lift with proper body mechanics to mimic a functional work task with self recognition of poor form  Eval status: unaware of thumb collapse, has hard time with over 10#     Current: Can perform with a kettle bell 15#, box still difficult. 3. Patient will improve FOTO score by 10 points to improve functional tolerance for ADL/IADL and work tasks.   Eval Status: FOTO 53  Current: 70 goal met   FOTO score = an established functional score where 100 = no disability       PLAN  [x]  Upgrade activities as tolerated     [x] Continue plan of care  []  Update interventions per flow sheet       []  Discharge due to:_  []  Other:_      Milady Hay OTD, OTR/L  2/25/2022  7:55 AM    Future Appointments   Date Time Provider Erik Tsang   2/25/2022  8:45 AM Jabari Bourgeois [facility]

## 2022-02-28 ENCOUNTER — HOSPITAL ENCOUNTER (OUTPATIENT)
Dept: PHYSICAL THERAPY | Age: 42
Discharge: HOME | End: 2022-02-28
Payer: COMMERCIAL

## 2022-02-28 PROCEDURE — 97110 THERAPEUTIC EXERCISES: CPT

## 2022-02-28 PROCEDURE — 97530 THERAPEUTIC ACTIVITIES: CPT

## 2022-02-28 NOTE — PROGRESS NOTES
OT DAILY TREATMENT NOTE - [facility]     Patient Name: Sangeetha Zavala  Date:2022  : 1980  [x]  Patient  Verified  Payor: Alfonso Cruzs / Plan: [employer] HMO/CHOICE PLUS/POS / Product Type: HMO /    In time:534  Out time:612  Total Treatment Time (min): 38  Total Timed Codes (min): 38  1:1 Treatment Time ( [address] only): 38   Visit #: 9 of 10    Treatment Area: Other abnormal involuntary movements [R25.8]  Tremor, unspecified [R25.1]    SUBJECTIVE  Pain Level (0-10 scale): 0/10  Any medication changes, allergies to medications, adverse drug reactions, diagnosis change, or new procedure performed?: [x] No    [] Yes (see summary sheet for update)  Subjective functional status/changes:   [] No changes reported  Pt reports tremors have continued to decrease in the thumb and hand    OBJECTIVE    11 min Therapeutic Exercise:  [x] See flow sheet :   Rationale: increase ROM, increase strength and improve coordination to improve the patients ability to proximal strength in the forearm and elbow and shoulder     27 min Therapeutic Activity:  [x]  See flow sheet :   Rationale: increase ROM, increase strength and improve coordination  to improve the patients ability to improve thumb stability during functional tasks         With   [x] TE   [] TA   [] neuro   [] other: Patient Education: [x] Review HEP    [x] Progressed/Changed HEP based on:   [] positioning   [] body mechanics   [] transfers   [] heat/ice application   [] Splint wear/care   [] Sensory re-education   [] scar management      [] other:                 Pain Level (0-10 scale) post treatment: 0/10    ASSESSMENT/Changes in Function: Pt continues to have decreased tremors during resistive and non resistive activity with no increase in pain with resistance provided to thumb extension, wrist deviation and rotation. Upgraded fine motor resistance nd putty resistance with no increase in tremors.  Pt has one more follow up appointment where we anticipate d/c onto HEP. Patient will continue to benefit from skilled OT services to modify and progress therapeutic interventions, address functional mobility deficits, address ROM deficits, address strength deficits, analyze and address soft tissue restrictions, analyze and cue movement patterns and analyze and modify body mechanics/ergonomics to attain remaining goals. [x]  See Plan of Care  []  See progress note/recertification  []  See Discharge Summary         Progress towards goals / Updated goals:  ST Weeks   1. Pt will be able to perform ball rotation activity without elbow support with less than 2 drops to improve fine motor skills     Eval status: 5 drops   Current: 0 drops Goal met   2. Pt will be able to improve MMT of thumb extension to 4+/5 to improve thumb stability during functional grasp  Eval status.  3/5   Current:  5/5 Goal met   3. Pt will be able to button jacket button in less than 30 seconds  with adaptive techniques and proximal support to improve ADL dressing tasks  Eval status: Takes 1 minute    Current:  Able to perform with let hand goal met   LT weeks   1. Pt will be able to identify 3 adaptive techniques that assist with tremors during functional activity   Eval status: Able to name one   Current:  Able to name 3  Goal met   2. Pt will be able to 15# box and lift with proper body mechanics to mimic a functional work task with self recognition of poor form  Eval status: unaware of thumb collapse, has hard time with over 10#     Current: Can perform with a kettle bell 15#, box still difficult.    3. Patient will improve FOTO score by 10 points to improve functional tolerance for ADL/IADL and work tasks.   Eval Status: FOTO 53  Current: 70 goal met   FOTO score = an established functional score where 100 = no disability    PLAN  [x]  Upgrade activities as tolerated     [x]  Continue plan of care  []  Update interventions per flow sheet       []  Discharge due to:_  [] Other:_      Manju Cody OTD, OTR/L  2/28/2022  3:54 PM    Future Appointments   Date Time Provider Erik Tsang   2/28/2022  5:45 PM [facility]

## 2022-03-04 ENCOUNTER — APPOINTMENT (OUTPATIENT)
Dept: PHYSICAL THERAPY | Age: 42
End: 2022-03-04
Payer: COMMERCIAL

## 2022-03-07 ENCOUNTER — HOSPITAL ENCOUNTER (OUTPATIENT)
Dept: PHYSICAL THERAPY | Age: 42
Discharge: HOME | End: 2022-03-07
Payer: COMMERCIAL

## 2022-03-07 PROCEDURE — 97530 THERAPEUTIC ACTIVITIES: CPT

## 2022-03-07 PROCEDURE — 97110 THERAPEUTIC EXERCISES: CPT

## 2022-03-07 NOTE — PROGRESS NOTES
OT DAILY TREATMENT NOTE - [facility]     Patient Name: Gene Acuna  Date:3/7/2022  : 1980  [x]  Patient  Verified  Payor: Vince Olsen / Plan: [facility] HMO/CHOICE PLUS/POS / Product Type: HMO /    In time:537  Out time:616  Total Treatment Time (min): 39  Total Timed Codes (min): 39  1:1 Treatment Time ( [address] only): 39   Visit #: 10 of 10    Treatment Area: Other abnormal involuntary movements [R25.8]  Tremor, unspecified [R25.1]    SUBJECTIVE  Pain Level (0-10 scale): 4/10  Any medication changes, allergies to medications, adverse drug reactions, diagnosis change, or new procedure performed?: [x] No    [] Yes (see summary sheet for update)  Subjective functional status/changes:   [] No changes reported  Pt reports having some pain and discomfort in the ankles, reports tremor of hands has near subsided. OBJECTIVE      14 min Therapeutic Exercise:  [x] See flow sheet :   Rationale: increase ROM and increase strength to improve the patients ability to proximal strength in the forearm and elbow and shoulder     25 min Therapeutic Activity:  [x]  See flow sheet :   Rationale: increase ROM and increase strength  to improve the patients ability to  improve thumb stability during functional tasks         With   [x] TE   [] TA   [] neuro   [] other: Patient Education: [x] Review HEP    [] Progressed/Changed HEP based on:   [] positioning   [] body mechanics   [] transfers   [] heat/ice application   [] Splint wear/care   [] Sensory re-education   [] scar management      [] other:         Pain Level (0-10 scale) post treatment: 4/10    ASSESSMENT/Changes in Function: Pt has been through course of occupational therapy to address functional tremor in the hand and thumb.  Pt has progressed well and no longer has shakiness of the hand during functional activities, is able to adapt ways when shaking does occur, is able to self recognize body positioning and stress factors that lead to increased shaking. Pt has progressed in proximal strength of the shoulder and bicep/forearm to improve proximal stability to lead to distal control. Pt is no longer in need of skilled OT services and will be d/c at this time. Pt will be d/c from skilled therapy at this time   [x]  See Plan of Care  []  See progress note/recertification  [x]  See Discharge Summary         Progress towards goals / Updated goals:  ST Weeks   1. Pt will be able to perform ball rotation activity without elbow support with less than 2 drops to improve fine motor skills     Eval status: 5 drops   Current: 0 drops Goal met   2. Pt will be able to improve MMT of thumb extension to 4+/5 to improve thumb stability during functional grasp  Eval status.  3/5   Current:  5/5 Goal met   3. Pt will be able to button jacket button in less than 30 seconds  with adaptive techniques and proximal support to improve ADL dressing tasks  Eval status: Takes 1 minute    Current:  Able to perform with let hand goal met   LT weeks   1. Pt will be able to identify 3 adaptive techniques that assist with tremors during functional activity   Eval status: Able to name one   Current:  Able to name 3  Goal met   2. Pt will be able to 15# box and lift with proper body mechanics to mimic a functional work task with self recognition of poor form  Eval status: unaware of thumb collapse, has hard time with over 10#     Current: Can perform with a kettle bell 15#, box still difficult.    3. Patient will improve FOTO score by 10 points to improve functional tolerance for ADL/IADL and work tasks.   Eval Status: FOTO 70  Current:  72  FOTO score = an established functional score where 100 = no disability    PLAN  []  Upgrade activities as tolerated     []  Continue plan of care  []  Update interventions per flow sheet       [x]  Discharge due to: Pt has met goals and continues to improve   []  Other:_      Belle Peoples OTD, OTR/L  3/7/2022  4:42 PM    Future Appointments Date Time Provider Erik Tsang   3/7/2022  5:45 PM [facility]

## 2022-03-07 NOTE — PROGRESS NOTES
[facility]  [address]  Ph [phone]  Fx [phone]    Occupational Therapy Discharge Summary      Patient name: Gene Acuna Start of Care: 22   Referral source: Grayson Todd DO : 1980   Medical/Treatment Diagnosis: Other abnormal involuntary movements [R25.8]  Tremor, unspecified [R25.1] Onset Date:      Prior Hospitalization: see medical history Provider#: [id number]   Medications: Verified on Patient Summary List    Comorbidities: Osteoporosis, thyroid problems, kidney transplant, complete throidectomy  Prior Level of Function:Pt independent in all self care, working full time in . Visits from Start of Care: 10    Missed Visits: 0    Reporting Period : 22 to 3/7/22    Goals/Measure of Progress:  ST Weeks   1. Pt will be able to perform ball rotation activity without elbow support with less than 2 drops to improve fine motor skills     Eval status: 5 drops   Current: 0 drops Goal met   2. Pt will be able to improve MMT of thumb extension to 4+/5 to improve thumb stability during functional grasp  Eval status.  3/5   Current:  5/5 Goal met   3. Pt will be able to button jacket button in less than 30 seconds  with adaptive techniques and proximal support to improve ADL dressing tasks  Eval status: Takes 1 minute    Current:  Able to perform with let hand goal met   LT weeks   1. Pt will be able to identify 3 adaptive techniques that assist with tremors during functional activity   Eval status: Able to name one   Current:  Able to name 3  Goal met   2. Pt will be able to 15# box and lift with proper body mechanics to mimic a functional work task with self recognition of poor form  Eval status: unaware of thumb collapse, has hard time with over 10#     Current: Can perform with a kettle bell 15#, box still difficult.     3.  Patient will improve FOTO score by 10 points to improve functional tolerance for ADL/IADL and work tasks. Eval Status: FOTO 70  Current:  72  FOTO score = an established functional score where 100 = no disability    Assessment/ Summary of Care: Pt has been through course of occupational therapy to address functional tremor in the hand and thumb. Pt has progressed well and no longer has shakiness of the hand during functional activities, is able to adapt ways when shaking does occur, is able to self recognize body positioning and stress factors that lead to increased shaking. Pt has progressed in proximal strength of the shoulder and bicep/forearm to improve proximal stability to lead to distal control. Pt is no longer in need of skilled OT services and will be d/c at this time.    Pt will be d/c from skilled therapy at this time      ASSESSMENT/RECOMMENDATIONS:  [x]Discontinue therapy: [x]Patient has reached or is progressing toward set goals      []Patient is non-compliant or has abdicated      []Due to lack of appreciable progress towards set goals    Lupe DAVIS, OTR/L  3/7/2022 4:43 PM

## 2022-12-22 ENCOUNTER — TRANSCRIBE ORDER (OUTPATIENT)
Dept: SCHEDULING | Age: 42
End: 2022-12-22

## 2022-12-22 DIAGNOSIS — M79.605 LEFT LEG PAIN: Primary | ICD-10-CM

## 2022-12-23 ENCOUNTER — HOSPITAL ENCOUNTER (OUTPATIENT)
Dept: VASCULAR SURGERY | Age: 42
Discharge: HOME | End: 2022-12-23
Attending: FAMILY MEDICINE
Payer: COMMERCIAL

## 2022-12-23 DIAGNOSIS — M79.605 LEFT LEG PAIN: ICD-10-CM

## 2022-12-23 PROCEDURE — 93971 EXTREMITY STUDY: CPT

## 2023-02-13 ENCOUNTER — ANESTHESIA EVENT (OUTPATIENT)
Facility: HOSPITAL | Age: 43
End: 2023-02-13
Payer: COMMERCIAL

## 2023-02-14 RX ORDER — SODIUM CHLORIDE 0.9 % (FLUSH) 0.9 %
5-40 SYRINGE (ML) INJECTION EVERY 12 HOURS SCHEDULED
Status: CANCELLED | OUTPATIENT
Start: 2023-02-14

## 2023-02-14 RX ORDER — SODIUM CHLORIDE 0.9 % (FLUSH) 0.9 %
5-40 SYRINGE (ML) INJECTION PRN
Status: CANCELLED | OUTPATIENT
Start: 2023-02-14

## 2023-02-14 RX ORDER — SODIUM CHLORIDE 9 MG/ML
INJECTION, SOLUTION INTRAVENOUS PRN
Status: CANCELLED | OUTPATIENT
Start: 2023-02-14

## 2023-02-15 ENCOUNTER — HOSPITAL ENCOUNTER (OUTPATIENT)
Facility: HOSPITAL | Age: 43
Setting detail: OUTPATIENT SURGERY
Discharge: HOME | End: 2023-02-15
Attending: OPHTHALMOLOGY | Admitting: OPHTHALMOLOGY
Payer: COMMERCIAL

## 2023-02-15 ENCOUNTER — ANESTHESIA (OUTPATIENT)
Facility: HOSPITAL | Age: 43
End: 2023-02-15
Payer: COMMERCIAL

## 2023-02-15 VITALS
WEIGHT: 204.7 LBS | SYSTOLIC BLOOD PRESSURE: 111 MMHG | TEMPERATURE: 97.8 F | OXYGEN SATURATION: 100 % | DIASTOLIC BLOOD PRESSURE: 61 MMHG | BODY MASS INDEX: 37.67 KG/M2 | HEIGHT: 62 IN | HEART RATE: 71 BPM | RESPIRATION RATE: 16 BRPM

## 2023-02-15 PROBLEM — H26.9 CATARACT: Status: ACTIVE | Noted: 2023-02-15

## 2023-02-15 LAB — HCG UR QL: NEGATIVE

## 2023-02-15 PROCEDURE — 6370000000 HC RX 637 (ALT 250 FOR IP): Performed by: OPHTHALMOLOGY

## 2023-02-15 PROCEDURE — 6360000002 HC RX W HCPCS: Performed by: OPHTHALMOLOGY

## 2023-02-15 PROCEDURE — 2720000010 HC SURG SUPPLY STERILE: Performed by: OPHTHALMOLOGY

## 2023-02-15 PROCEDURE — 2500000003 HC RX 250 WO HCPCS: Performed by: OPHTHALMOLOGY

## 2023-02-15 PROCEDURE — 3600000004 HC SURGERY LEVEL 4 BASE: Performed by: OPHTHALMOLOGY

## 2023-02-15 PROCEDURE — 7100000011 HC PHASE II RECOVERY - ADDTL 15 MIN: Performed by: OPHTHALMOLOGY

## 2023-02-15 PROCEDURE — V2632 POST CHMBR INTRAOCULAR LENS: HCPCS | Performed by: OPHTHALMOLOGY

## 2023-02-15 PROCEDURE — 3600000014 HC SURGERY LEVEL 4 ADDTL 15MIN: Performed by: OPHTHALMOLOGY

## 2023-02-15 PROCEDURE — 2709999900 HC NON-CHARGEABLE SUPPLY: Performed by: OPHTHALMOLOGY

## 2023-02-15 PROCEDURE — 7100000010 HC PHASE II RECOVERY - FIRST 15 MIN: Performed by: OPHTHALMOLOGY

## 2023-02-15 PROCEDURE — 6360000002 HC RX W HCPCS: Performed by: ANESTHESIOLOGY

## 2023-02-15 PROCEDURE — 81025 URINE PREGNANCY TEST: CPT

## 2023-02-15 PROCEDURE — 3700000001 HC ADD 15 MINUTES (ANESTHESIA): Performed by: OPHTHALMOLOGY

## 2023-02-15 PROCEDURE — 3700000000 HC ANESTHESIA ATTENDED CARE: Performed by: OPHTHALMOLOGY

## 2023-02-15 PROCEDURE — 6360000002 HC RX W HCPCS: Performed by: NURSE ANESTHETIST, CERTIFIED REGISTERED

## 2023-02-15 PROCEDURE — 2580000003 HC RX 258: Performed by: OPHTHALMOLOGY

## 2023-02-15 DEVICE — ACRYSOF(R) SINGLE-PIECE ACRYLIC FOLDABLE IOL,UV-ABSORBING POSTERIOR CHAMBER LENS (IOL/PC),13.0MM LENGTH, 6.0MM BICONVEX OPTIC, PLANARHAPTICS.
Type: IMPLANTABLE DEVICE | Site: EYE | Status: FUNCTIONAL
Brand: ACRYSOF®

## 2023-02-15 RX ORDER — TROPICAMIDE 10 MG/ML
1 SOLUTION/ DROPS OPHTHALMIC
Status: COMPLETED | OUTPATIENT
Start: 2023-02-15 | End: 2023-02-15

## 2023-02-15 RX ORDER — MIDAZOLAM HYDROCHLORIDE 1 MG/ML
INJECTION INTRAMUSCULAR; INTRAVENOUS PRN
Status: DISCONTINUED | OUTPATIENT
Start: 2023-02-15 | End: 2023-02-15 | Stop reason: SDUPTHER

## 2023-02-15 RX ORDER — PHENYLEPHRINE HCL 2.5 %
1 DROPS OPHTHALMIC (EYE)
Status: COMPLETED | OUTPATIENT
Start: 2023-02-15 | End: 2023-02-15

## 2023-02-15 RX ORDER — SODIUM CHLORIDE, SODIUM LACTATE, POTASSIUM CHLORIDE, CALCIUM CHLORIDE 600; 310; 30; 20 MG/100ML; MG/100ML; MG/100ML; MG/100ML
INJECTION, SOLUTION INTRAVENOUS CONTINUOUS
Status: DISCONTINUED | OUTPATIENT
Start: 2023-02-15 | End: 2023-02-15 | Stop reason: HOSPADM

## 2023-02-15 RX ORDER — SODIUM CHLORIDE, POTASSIUM CHLORIDE, CALCIUM CHLORIDE, MAGNESIUM CHLORIDE, SODIUM ACETATE, AND SODIUM CITRATE 6.4; .75; .48; .3; 3.9; 1.7 MG/ML; MG/ML; MG/ML; MG/ML; MG/ML; MG/ML
SOLUTION IRRIGATION PRN
Status: DISCONTINUED | OUTPATIENT
Start: 2023-02-15 | End: 2023-02-15 | Stop reason: ALTCHOICE

## 2023-02-15 RX ORDER — LIDOCAINE HYDROCHLORIDE 10 MG/ML
INJECTION, SOLUTION EPIDURAL; INFILTRATION; INTRACAUDAL; PERINEURAL PRN
Status: DISCONTINUED | OUTPATIENT
Start: 2023-02-15 | End: 2023-02-15 | Stop reason: ALTCHOICE

## 2023-02-15 RX ORDER — EPINEPHRINE 1 MG/ML
INJECTION, SOLUTION, CONCENTRATE INTRAVENOUS PRN
Status: DISCONTINUED | OUTPATIENT
Start: 2023-02-15 | End: 2023-02-15 | Stop reason: ALTCHOICE

## 2023-02-15 RX ORDER — FENTANYL CITRATE 50 UG/ML
INJECTION, SOLUTION INTRAMUSCULAR; INTRAVENOUS PRN
Status: DISCONTINUED | OUTPATIENT
Start: 2023-02-15 | End: 2023-02-15 | Stop reason: SDUPTHER

## 2023-02-15 RX ORDER — OFLOXACIN 3 MG/ML
1 SOLUTION/ DROPS OPHTHALMIC PRN
Status: DISCONTINUED | OUTPATIENT
Start: 2023-02-15 | End: 2023-02-15 | Stop reason: HOSPADM

## 2023-02-15 RX ORDER — ONDANSETRON 2 MG/ML
INJECTION INTRAMUSCULAR; INTRAVENOUS PRN
Status: DISCONTINUED | OUTPATIENT
Start: 2023-02-15 | End: 2023-02-15 | Stop reason: SDUPTHER

## 2023-02-15 RX ADMIN — TROPICAMIDE 1 DROP: 10 SOLUTION/ DROPS OPHTHALMIC at 10:56

## 2023-02-15 RX ADMIN — TROPICAMIDE 1 DROP: 10 SOLUTION/ DROPS OPHTHALMIC at 10:51

## 2023-02-15 RX ADMIN — PHENYLEPHRINE HYDROCHLORIDE 1 DROP: 25 SOLUTION/ DROPS OPHTHALMIC at 10:56

## 2023-02-15 RX ADMIN — MIDAZOLAM 1 MG: 1 INJECTION INTRAMUSCULAR; INTRAVENOUS at 12:22

## 2023-02-15 RX ADMIN — PHENYLEPHRINE HYDROCHLORIDE 1 DROP: 25 SOLUTION/ DROPS OPHTHALMIC at 11:06

## 2023-02-15 RX ADMIN — MIDAZOLAM 2 MG: 1 INJECTION INTRAMUSCULAR; INTRAVENOUS at 12:15

## 2023-02-15 RX ADMIN — MIDAZOLAM 2 MG: 1 INJECTION INTRAMUSCULAR; INTRAVENOUS at 12:35

## 2023-02-15 RX ADMIN — PHENYLEPHRINE HYDROCHLORIDE 1 DROP: 25 SOLUTION/ DROPS OPHTHALMIC at 11:01

## 2023-02-15 RX ADMIN — FENTANYL CITRATE 50 MCG: 50 INJECTION, SOLUTION INTRAMUSCULAR; INTRAVENOUS at 12:24

## 2023-02-15 RX ADMIN — FENTANYL CITRATE 50 MCG: 50 INJECTION, SOLUTION INTRAMUSCULAR; INTRAVENOUS at 12:35

## 2023-02-15 RX ADMIN — TROPICAMIDE 1 DROP: 10 SOLUTION/ DROPS OPHTHALMIC at 11:06

## 2023-02-15 RX ADMIN — FENTANYL CITRATE 50 MCG: 50 INJECTION, SOLUTION INTRAMUSCULAR; INTRAVENOUS at 12:18

## 2023-02-15 RX ADMIN — MIDAZOLAM 1 MG: 1 INJECTION INTRAMUSCULAR; INTRAVENOUS at 12:19

## 2023-02-15 RX ADMIN — LIDOCAINE HYDROCHLORIDE: 35 GEL OPHTHALMIC at 11:09

## 2023-02-15 RX ADMIN — TROPICAMIDE 1 DROP: 10 SOLUTION/ DROPS OPHTHALMIC at 11:01

## 2023-02-15 RX ADMIN — OFLOXACIN 1 DROP: 3 SOLUTION OPHTHALMIC at 10:51

## 2023-02-15 RX ADMIN — SODIUM CHLORIDE, SODIUM LACTATE, POTASSIUM CHLORIDE, AND CALCIUM CHLORIDE: 600; 310; 30; 20 INJECTION, SOLUTION INTRAVENOUS at 12:12

## 2023-02-15 RX ADMIN — PHENYLEPHRINE HYDROCHLORIDE 1 DROP: 25 SOLUTION/ DROPS OPHTHALMIC at 10:51

## 2023-02-15 RX ADMIN — ONDANSETRON HYDROCHLORIDE 4 MG: 2 INJECTION INTRAMUSCULAR; INTRAVENOUS at 12:35

## 2023-02-15 RX ADMIN — FENTANYL CITRATE 50 MCG: 50 INJECTION, SOLUTION INTRAMUSCULAR; INTRAVENOUS at 12:21

## 2023-02-15 RX ADMIN — FENTANYL CITRATE 50 MCG: 50 INJECTION, SOLUTION INTRAMUSCULAR; INTRAVENOUS at 12:25

## 2023-02-15 ASSESSMENT — PAIN - FUNCTIONAL ASSESSMENT: PAIN_FUNCTIONAL_ASSESSMENT: 0-10

## 2023-02-15 NOTE — OP NOTE
Cataract Operative Note      Patient: Deepa Watkins               Sex: female          DOA: 2/15/2023         YOB: 1980      Age:  40 y.o.        LOS:  LOS: 0 days     Preoperative Diagnosis: Cataract right eye    Postoperative Diagnosis:  Cataract  right eye  Surgeon: Stanford Rosas MD, M.D. Anesthesia:  Topical anesthesia  Procedure:  Phacoemulsification of posterior chamber for intraocular lens implantation right    Fluids:  0    Procedure in Detail: The operative eye was prepped and draped in the usual fashion. A lid speculum was placed in the operative eye. A clear cornea approach was utilized. A paracentesis incision(s) was constructed with a 1 mm slit knife. One percent preservative-free lidocaine followed by viscoelastic was instilled into the anterior chamber. A clear corneal incision was made with a slit knife. A continuous curvilinear capsulorrhexis was constructed followed by hydrodissection. A phacoemulsification tip was placed into the eye, and the lens nucleus was emulsified. The irrigation/aspiration device was then used to remove any remaining cortical material.  Polishing of the capsule was performed as needed. The intraocular lens was then placed into the capsular bag after it was re-inflated with viscoelastic. The remaining viscoelastic was then removed using the irrigation/aspiration device. BSS on a cannula was then used to hydrate the wound edges. At the end of the procedure the wound was found to be watertight, the anterior chamber was deep and the pupil round. No blood loss during surgery. An antibiotic and anti-inflammatroy was placed into the operative eye. The lid speculum was removed. Protective sunglasses were then placed onto the patient. The patient was taken to the [address] [place] [address] Unit (PACU) in good condition having tolerated the procedure well. Estimated Blood Loss: 0                 Implants:   Implant Name Type Inv.  Item Serial No.  Lot No. LRB No. Used Action   LENS INTOCU 6.0 TO 30.0 DIOPT 118.4 A CONSTANT 0DEG ANG - [id number]  LENS INTOCU 6.0 TO 30.0 DIOPT 118.4 A CONSTANT 0DEG ANG [id number] [employer]  Right 1 Implanted       Specimens: * No specimens in log *            Complications:  None           Pedro Jean-Baptiste MD, M.D.  [unfilled]  12:43 PM

## 2023-02-15 NOTE — DISCHARGE INSTRUCTIONS
Post-Operative Cataract Instructions  [facility] INVASIVE SURGERY [facility]  Jovi Basilio M.D. Vernon Scott. Gustavo Ryan M.D.  [facility] [address] , [facility]  [phone]      Diet  Resume normal diet. Do not drink alcoholic beverages, including beer for 24 hours. Activity  Do not drive a car or operate any hazardous machinery the day of surgery. You may resume normal activities as tolerated. No bending or heavy lifting. No reading or computer work after your surgery. You may watch TV. Wound Care  Anticipate that your eye will tear and water. You may also experience a sensation of a foreign body, sand, or grit in the surgical eye, this is normal.  Do not touch the affected eye.  ** Do not remove eye shield unless directed to do so by your physician. **  Wear eye shield when resting or sleeping for one week. Medications  Take Tylenol Extra Strength two (2) tablets by mouth upon arrival home and then every four (4) hours as needed for discomfort. Regarding Eye drops:  -  Begin using your eye drops as directed by your physician in the (right) operative eye. One drop of     []   Zymar    []  Vigamox   along with one (1) drop     []  Acular    []  Voltaren   every four (4) hours while awake. Wait five (5) minutes then apply one (1) drop     []  Pred Forte    []  Econopred Plus   every four (4) hours while awake. If you take glaucoma medications, continue to do so unless the physician otherwise instructs you. You may use artificial tears as needed if your eye feels scratchy. Notify your Physician Immediately for any of the following:  Excessive pain not relieved by Tylenol especially headache or increasing pressure to the operative eye. Persistent nausea lasting more than eight hours. If any vomiting occurs. If any questions or concerns arise, call your Surgeon at [phone].        DISCHARGE SUMMARY from Nurse    PATIENT INSTRUCTIONS:    After general anesthesia or intravenous sedation, for 24 hours or while taking prescription Narcotics:  Limit your activities  Do not drive and operate hazardous machinery  Do not make important personal or business decisions  Do  not drink alcoholic beverages  If you have not urinated within 8 hours after discharge, please contact your surgeon on call. Report the following to your surgeon:  Excessive pain, swelling, redness or odor of or around the surgical area  Temperature over 100.5  Nausea and vomiting lasting longer than 4 hours or if unable to take medications  Any signs of decreased circulation or nerve impairment to extremity: change in color, persistent  numbness, tingling, coldness or increase pain  Any questions    What to do at Home:  Recommended activity: ambulate in house,     If you experience any of the following symptoms above, please follow up with Dr. Brad Pope. *  Please give a list of your current medications to your Primary Care Provider. *  Please update this list whenever your medications are discontinued, doses are      changed, or new medications (including over-the-counter products) are added. *  Please carry medication information at all times in case of emergency situations. These are general instructions for a healthy lifestyle:    No smoking/ No tobacco products/ Avoid exposure to second hand smoke  Surgeon General's Warning:  Quitting smoking now greatly reduces serious risk to your health. Obesity, smoking, and sedentary lifestyle greatly increases your risk for illness    A healthy diet, regular physical exercise & weight monitoring are important for maintaining a healthy lifestyle    You may be retaining fluid if you have a history of heart failure or if you experience any of the following symptoms:  Weight gain of 3 pounds or more overnight or 5 pounds in a week, increased swelling in our hands or feet or shortness of breath while lying flat in bed.   Please call your doctor as soon as you notice any of these symptoms; do not wait until your next office visit.    Patient armband removed and shredded     The discharge information has been reviewed with the patient and caregiver.  The patient and caregiver verbalized understanding.  Discharge medications reviewed with the patient and caregiver and appropriate educational materials and side effects teaching were provided.  ___________________________________________________________________________________________________________________________________

## 2023-02-15 NOTE — INTERVAL H&P NOTE
Update History & Physical    The patient's History and Physical of February 15, 2023 was reviewed with the patient and I examined the patient. There was no change. The surgical site was confirmed by the patient and me. Plan: The risks, benefits, expected outcome, and alternative to the recommended procedure have been discussed with the patient. Patient understands and wants to proceed with the procedure.      Electronically signed by Tonia Hamilton MD on 2/15/2023 at 10:47 AM

## 2023-02-15 NOTE — PERIOP NOTE
Jessica Park was informed of pt's allergy yo Atropine and Tropicamide is contraindicated, secondary to the allergy. He stated to continue with his order,due to it is a minute amount.

## 2023-02-15 NOTE — PERIOP NOTE
TRANSFER - IN REPORT:    Verbal report received from LUIGI Thompson RN on [employer]  being received from PACU for routine post-op      Report consisted of patient's Situation, Background, Assessment and   Recommendations(SBAR). Information from the following report(s) Nurse Handoff Report, Surgery Report, Intake/Output, and MAR was reviewed with the receiving nurse. Opportunity for questions and clarification was provided. Assessment completed upon patient's arrival to unit and care assumed.

## 2023-02-15 NOTE — ANESTHESIA PRE PROCEDURE
Department of Anesthesiology  Preprocedure Note       Name:  Veena Dumont   Age:  40 y.o.  :  1980                                          MRN:  [id number]         Date:  2/15/2023      Surgeon: Yann Reid):  Pita Sy MD    Procedure: Procedure(s):  CATARACT EXTRACTION WITH INTRAOCULAR LENS IMPLANTATION OF THE RIGHT EYE    Medications prior to admission:   Prior to Admission medications    Medication Sig Start Date End Date Taking? Authorizing Provider   PANTOPRAZOLE SODIUM PO Take 40 mg by mouth daily    Historical Provider, MD   apixaban (ELIQUIS) 5 MG TABS tablet Take 5 mg by mouth 2 times daily    Historical Provider, MD   cycloSPORINE modified (NEORAL) 25 MG capsule Take 25 mg by mouth 2 times daily    Ar Automatic Reconciliation   levothyroxine (SYNTHROID) 75 MCG tablet Take by mouth every morning (before breakfast)    Ar Automatic Reconciliation   mycophenolate (CELLCEPT) 250 MG capsule Take 250 mg by mouth 2 times daily    Ar Automatic Reconciliation   predniSONE (DELTASONE) 5 MG tablet Take by mouth    Ar Automatic Reconciliation       Current medications:    Current Facility-Administered Medications   Medication Dose Route Frequency Provider Last Rate Last Admin    lactated ringers IV soln infusion   IntraVENous Continuous Pita Sy MD        lidocaine (AKTEN) 3.5 % ophthalmic gel   Right Eye On Call Pita Sy MD   Given at 02/15/23 1109    ofloxacin (OCUFLOX) 0.3 % solution 1 drop  1 drop Right Eye PRN Pita Sy MD   1 drop at 02/15/23 1051       Allergies:     Allergies   Allergen Reactions    Atropine      Other reaction(s): Unknown (comments)    Promethazine      Other reaction(s): Unknown (comments)       Problem List:    Patient Active Problem List   Diagnosis Code    Osteoporosis M81.0    Ulcerative colitis ([facility] [address] K51.90       Past Medical History:        Diagnosis Date    GERD (gastroesophageal reflux disease)     Hx of blood clots 2022 left leg    Kidney transplant recipient 1999    Neuropathy     Thyroid disease     Wears glasses        Past Surgical History:        Procedure Laterality Date    APPENDECTOMY      EYE SURGERY  1982    \"lazy eye\"    KIDNEY TRANSPLANT  1999    ORTHOPEDIC SURGERY      right foot x2; left knee arthroscopy; left knee osteostomy(Screws)    THYROIDECTOMY  2011    TONSILLECTOMY         Social History:    Social History     Tobacco Use    Smoking status: Never    Smokeless tobacco: Never   Substance Use Topics    Alcohol use: Never                                Counseling given: Not Answered      Vital Signs (Current):   Vitals:    02/15/23 1021   BP: (!) 145/83   Pulse: 80   Resp: 16   Temp: 97.1 °F (36.2 °C)   TempSrc: Temporal   SpO2: 100%   Weight: 204 lb 11.2 oz (92.9 kg)   Height: 5' 2\" (1.575 m)                                              BP Readings from Last 3 Encounters:   02/15/23 (!) 145/83       NPO Status: Time of last liquid consumption: 0500                        Time of last solid consumption: 1800                        Date of last liquid consumption: 02/15/23                        Date of last solid food consumption: 02/14/23    BMI:   Wt Readings from Last 3 Encounters:   02/15/23 204 lb 11.2 oz (92.9 kg)   02/10/23 200 lb (90.7 kg)     Body mass index is 37.44 kg/m². CBC: No results found for: WBC, RBC, HGB, HCT, MCV, RDW, PLT    CMP: No results found for: NA, K, CL, CO2, BUN, CREATININE, GFRAA, AGRATIO, LABGLOM, GLUCOSE, GLU, PROT, CALCIUM, BILITOT, ALKPHOS, AST, ALT    POC Tests: No results for input(s): POCGLU, POCNA, POCK, POCCL, POCBUN, POCHEMO, POCHCT in the last 72 hours.     Coags: No results found for: PROTIME, INR, APTT    HCG (If Applicable):   Lab Results   Component Value Date    PREGTESTUR Negative 02/15/2023        ABGs: No results found for: PHART, PO2ART, NIR9KJY, RHL6SVB, BEART, Y1HNRHGQ     Type & Screen (If Applicable):  No results found for: LABABO, [address]    Drug/Infectious Status (If Applicable):  No results found for: HIV, HEPCAB    COVID-19 Screening (If Applicable): No results found for: COVID19        Anesthesia Evaluation  Patient summary reviewed and Nursing notes reviewed no history of anesthetic complications:   Airway: Mallampati: I  TM distance: >3 FB   Neck ROM: full  Mouth opening: > = 3 FB   Dental: normal exam         Pulmonary:Negative Pulmonary ROS                              Cardiovascular:Negative CV ROS  Exercise tolerance: good (>4 METS),                     Neuro/Psych:   Negative Neuro/Psych ROS              GI/Hepatic/Renal:   (+) GERD: well controlled, PUD, renal disease:,          ROS comment: Kidney transplant recipient . Endo/Other: Negative Endo/Other ROS                    Abdominal:             Vascular: negative vascular ROS. Other Findings:           Anesthesia Plan      MAC     ASA 2       Induction: intravenous. Anesthetic plan and risks discussed with patient. Plan discussed with CRNA.     Attending anesthesiologist reviewed and agrees with Brittany Ashraf MD   2/15/2023

## 2023-02-15 NOTE — ANESTHESIA POSTPROCEDURE EVALUATION
Department of Anesthesiology  Postprocedure Note    Patient: Francia Gonzalez  MRN: [id number]  YOB: 1980  Date of evaluation: 2/15/2023      Procedure Summary     Date: 02/15/23 Room / Location: THE [facility] 02 / [facility] OR    Anesthesia Start: 1212 Anesthesia Stop: 1184    Procedure: CATARACT EXTRACTION WITH INTRAOCULAR LENS IMPLANTATION OF THE RIGHT EYE (Right: Eye) Diagnosis:       Nuclear sclerotic cataract of right eye      (CATARACT OF RIGHT EYE)    Surgeons: Ebenezer Rogers MD Responsible Provider: Liza Smith MD    Anesthesia Type: MAC ASA Status: 2          Anesthesia Type: MAC    Nathalie Phase I: Nathalie Score: 10    Nathalie Phase II: Nathalie Score: 10      Anesthesia Post Evaluation    Level of consciousness: awake  Airway patency: patent  Nausea & Vomiting: no nausea and no vomiting  Complications: no  Cardiovascular status: hemodynamically stable  Respiratory status: acceptable and spontaneous ventilation  Hydration status: stable

## 2023-02-15 NOTE — PERIOP NOTE
Reviewed PTA medication list with patient/caregiver and patient/caregiver denies any additional medications. Patient admits to having a responsible adult care for them at home for at least 24 hours after surgery. Patient encouraged to use gown warming system and informed that using said warming gown to regulate body temperature prior to a procedure has been shown to help reduce the risks of blood clots and infection. Patient's pharmacy of choice verified and documented in PTA medication section. Dual skin assessment & fall risk band verification completed with [facility] RN. Urine pregnancy results negative and verified with Celeste.

## 2023-08-14 ENCOUNTER — HOSPITAL ENCOUNTER (OUTPATIENT)
Facility: HOSPITAL | Age: 43
Discharge: HOME | End: 2023-08-17
Payer: COMMERCIAL

## 2023-08-14 DIAGNOSIS — N84.0 POLYP OF CORPUS UTERI: ICD-10-CM

## 2023-08-14 DIAGNOSIS — N94.5 SECONDARY DYSMENORRHEA: ICD-10-CM

## 2023-08-14 DIAGNOSIS — N93.8 DYSFUNCTIONAL UTERINE BLEEDING: ICD-10-CM

## 2023-08-14 LAB
ANION GAP SERPL CALC-SCNC: 5 MMOL/L (ref 3–18)
BASOPHILS # BLD: 0 K/UL (ref 0–0.1)
BASOPHILS NFR BLD: 0 % (ref 0–2)
BUN SERPL-MCNC: 20 MG/DL (ref 7–18)
BUN/CREAT SERPL: 16 (ref 12–20)
CALCIUM SERPL-MCNC: 9.7 MG/DL (ref 8.5–10.1)
CHLORIDE SERPL-SCNC: 106 MMOL/L (ref 100–111)
CO2 SERPL-SCNC: 26 MMOL/L (ref 21–32)
CREAT SERPL-MCNC: 1.28 MG/DL (ref 0.6–1.3)
DIFFERENTIAL METHOD BLD: ABNORMAL
EKG ATRIAL RATE: 59 BPM
EKG DIAGNOSIS: NORMAL
EKG P AXIS: 61 DEGREES
EKG P-R INTERVAL: 164 MS
EKG Q-T INTERVAL: 410 MS
EKG QRS DURATION: 82 MS
EKG QTC CALCULATION (BAZETT): 405 MS
EKG R AXIS: 77 DEGREES
EKG T AXIS: 60 DEGREES
EKG VENTRICULAR RATE: 59 BPM
EOSINOPHIL # BLD: 0.2 K/UL (ref 0–0.4)
EOSINOPHIL NFR BLD: 2 % (ref 0–5)
ERYTHROCYTE [DISTWIDTH] IN BLOOD BY AUTOMATED COUNT: 13.9 % (ref 11.6–14.5)
EST. AVERAGE GLUCOSE BLD GHB EST-MCNC: 103 MG/DL
GLUCOSE SERPL-MCNC: 93 MG/DL (ref 74–99)
HBA1C MFR BLD: 5.2 % (ref 4.2–5.6)
HCT VFR BLD AUTO: 36.2 % (ref 35–45)
HGB BLD-MCNC: 11.4 G/DL (ref 12–16)
IMM GRANULOCYTES # BLD AUTO: 0.1 K/UL (ref 0–0.04)
IMM GRANULOCYTES NFR BLD AUTO: 1 % (ref 0–0.5)
LYMPHOCYTES # BLD: 1.9 K/UL (ref 0.9–3.6)
LYMPHOCYTES NFR BLD: 22 % (ref 21–52)
MCH RBC QN AUTO: 27.9 PG (ref 24–34)
MCHC RBC AUTO-ENTMCNC: 31.5 G/DL (ref 31–37)
MCV RBC AUTO: 88.5 FL (ref 78–100)
MONOCYTES # BLD: 0.8 K/UL (ref 0.05–1.2)
MONOCYTES NFR BLD: 9 % (ref 3–10)
NEUTS SEG # BLD: 5.8 K/UL (ref 1.8–8)
NEUTS SEG NFR BLD: 66 % (ref 40–73)
NRBC # BLD: 0 K/UL (ref 0–0.01)
NRBC BLD-RTO: 0 PER 100 WBC
PLATELET # BLD AUTO: 202 K/UL (ref 135–420)
PMV BLD AUTO: 8.7 FL (ref 9.2–11.8)
POTASSIUM SERPL-SCNC: 4 MMOL/L (ref 3.5–5.5)
RBC # BLD AUTO: 4.09 M/UL (ref 4.2–5.3)
SODIUM SERPL-SCNC: 137 MMOL/L (ref 136–145)
WBC # BLD AUTO: 8.7 K/UL (ref 4.6–13.2)

## 2023-08-14 PROCEDURE — 93010 ELECTROCARDIOGRAM REPORT: CPT | Performed by: INTERNAL MEDICINE

## 2023-08-14 PROCEDURE — 36415 COLL VENOUS BLD VENIPUNCTURE: CPT

## 2023-08-14 PROCEDURE — 85025 COMPLETE CBC W/AUTO DIFF WBC: CPT

## 2023-08-14 PROCEDURE — 93005 ELECTROCARDIOGRAM TRACING: CPT

## 2023-08-14 PROCEDURE — 80048 BASIC METABOLIC PNL TOTAL CA: CPT

## 2023-08-14 PROCEDURE — 83036 HEMOGLOBIN GLYCOSYLATED A1C: CPT

## 2023-08-16 ENCOUNTER — ANESTHESIA EVENT (OUTPATIENT)
Facility: HOSPITAL | Age: 43
End: 2023-08-16
Payer: COMMERCIAL

## 2023-08-16 ENCOUNTER — HOSPITAL ENCOUNTER (OUTPATIENT)
Facility: HOSPITAL | Age: 43
Setting detail: OUTPATIENT SURGERY
Discharge: HOME | End: 2023-08-16
Attending: OPHTHALMOLOGY | Admitting: OPHTHALMOLOGY
Payer: COMMERCIAL

## 2023-08-16 ENCOUNTER — ANESTHESIA (OUTPATIENT)
Facility: HOSPITAL | Age: 43
End: 2023-08-16
Payer: COMMERCIAL

## 2023-08-16 VITALS
OXYGEN SATURATION: 100 % | BODY MASS INDEX: 35.67 KG/M2 | WEIGHT: 201.3 LBS | TEMPERATURE: 97.5 F | HEART RATE: 61 BPM | RESPIRATION RATE: 16 BRPM | SYSTOLIC BLOOD PRESSURE: 121 MMHG | DIASTOLIC BLOOD PRESSURE: 73 MMHG | HEIGHT: 63 IN

## 2023-08-16 LAB — HCG UR QL: NEGATIVE

## 2023-08-16 PROCEDURE — 2580000003 HC RX 258: Performed by: OPHTHALMOLOGY

## 2023-08-16 PROCEDURE — 3600000002 HC SURGERY LEVEL 2 BASE: Performed by: OPHTHALMOLOGY

## 2023-08-16 PROCEDURE — 7100000011 HC PHASE II RECOVERY - ADDTL 15 MIN: Performed by: OPHTHALMOLOGY

## 2023-08-16 PROCEDURE — 6370000000 HC RX 637 (ALT 250 FOR IP): Performed by: OPHTHALMOLOGY

## 2023-08-16 PROCEDURE — 2720000010 HC SURG SUPPLY STERILE: Performed by: OPHTHALMOLOGY

## 2023-08-16 PROCEDURE — 3700000001 HC ADD 15 MINUTES (ANESTHESIA): Performed by: OPHTHALMOLOGY

## 2023-08-16 PROCEDURE — 7100000010 HC PHASE II RECOVERY - FIRST 15 MIN: Performed by: OPHTHALMOLOGY

## 2023-08-16 PROCEDURE — 2500000003 HC RX 250 WO HCPCS: Performed by: OPHTHALMOLOGY

## 2023-08-16 PROCEDURE — 6360000002 HC RX W HCPCS: Performed by: NURSE ANESTHETIST, CERTIFIED REGISTERED

## 2023-08-16 PROCEDURE — 3700000000 HC ANESTHESIA ATTENDED CARE: Performed by: OPHTHALMOLOGY

## 2023-08-16 PROCEDURE — 6360000002 HC RX W HCPCS: Performed by: OPHTHALMOLOGY

## 2023-08-16 PROCEDURE — 3600000012 HC SURGERY LEVEL 2 ADDTL 15MIN: Performed by: OPHTHALMOLOGY

## 2023-08-16 PROCEDURE — 81025 URINE PREGNANCY TEST: CPT

## 2023-08-16 PROCEDURE — 2709999900 HC NON-CHARGEABLE SUPPLY: Performed by: OPHTHALMOLOGY

## 2023-08-16 RX ORDER — SODIUM CHLORIDE 0.9 % (FLUSH) 0.9 %
5-40 SYRINGE (ML) INJECTION EVERY 12 HOURS SCHEDULED
Status: CANCELLED | OUTPATIENT
Start: 2023-08-16

## 2023-08-16 RX ORDER — SODIUM CHLORIDE, POTASSIUM CHLORIDE, CALCIUM CHLORIDE, MAGNESIUM CHLORIDE, SODIUM ACETATE, AND SODIUM CITRATE 6.4; .75; .48; .3; 3.9; 1.7 MG/ML; MG/ML; MG/ML; MG/ML; MG/ML; MG/ML
SOLUTION IRRIGATION PRN
Status: DISCONTINUED | OUTPATIENT
Start: 2023-08-16 | End: 2023-08-16 | Stop reason: ALTCHOICE

## 2023-08-16 RX ORDER — ONDANSETRON 2 MG/ML
4 INJECTION INTRAMUSCULAR; INTRAVENOUS
Status: CANCELLED | OUTPATIENT
Start: 2023-08-16 | End: 2023-08-17

## 2023-08-16 RX ORDER — LABETALOL HYDROCHLORIDE 5 MG/ML
10 INJECTION, SOLUTION INTRAVENOUS
Status: CANCELLED | OUTPATIENT
Start: 2023-08-16

## 2023-08-16 RX ORDER — OXYCODONE HYDROCHLORIDE 5 MG/1
5 TABLET ORAL
Status: CANCELLED | OUTPATIENT
Start: 2023-08-16 | End: 2023-08-17

## 2023-08-16 RX ORDER — EPINEPHRINE 1 MG/ML
INJECTION, SOLUTION, CONCENTRATE INTRAVENOUS PRN
Status: DISCONTINUED | OUTPATIENT
Start: 2023-08-16 | End: 2023-08-16 | Stop reason: ALTCHOICE

## 2023-08-16 RX ORDER — OFLOXACIN 3 MG/ML
1 SOLUTION/ DROPS OPHTHALMIC EVERY 30 MIN PRN
Status: DISCONTINUED | OUTPATIENT
Start: 2023-08-16 | End: 2023-08-16 | Stop reason: HOSPADM

## 2023-08-16 RX ORDER — SODIUM CHLORIDE, SODIUM LACTATE, POTASSIUM CHLORIDE, CALCIUM CHLORIDE 600; 310; 30; 20 MG/100ML; MG/100ML; MG/100ML; MG/100ML
INJECTION, SOLUTION INTRAVENOUS CONTINUOUS
Status: CANCELLED | OUTPATIENT
Start: 2023-08-16

## 2023-08-16 RX ORDER — DROPERIDOL 2.5 MG/ML
0.62 INJECTION, SOLUTION INTRAMUSCULAR; INTRAVENOUS
Status: CANCELLED | OUTPATIENT
Start: 2023-08-16 | End: 2023-08-17

## 2023-08-16 RX ORDER — BALANCED SALT SOLUTION 6.4; .75; .48; .3; 3.9; 1.7 MG/ML; MG/ML; MG/ML; MG/ML; MG/ML; MG/ML
SOLUTION OPHTHALMIC PRN
Status: DISCONTINUED | OUTPATIENT
Start: 2023-08-16 | End: 2023-08-16 | Stop reason: ALTCHOICE

## 2023-08-16 RX ORDER — TROPICAMIDE 10 MG/ML
1 SOLUTION/ DROPS OPHTHALMIC
Status: COMPLETED | OUTPATIENT
Start: 2023-08-16 | End: 2023-08-16

## 2023-08-16 RX ORDER — LIDOCAINE HYDROCHLORIDE 10 MG/ML
INJECTION, SOLUTION EPIDURAL; INFILTRATION; INTRACAUDAL; PERINEURAL PRN
Status: DISCONTINUED | OUTPATIENT
Start: 2023-08-16 | End: 2023-08-16 | Stop reason: ALTCHOICE

## 2023-08-16 RX ORDER — SODIUM CHLORIDE 0.9 % (FLUSH) 0.9 %
5-40 SYRINGE (ML) INJECTION PRN
Status: CANCELLED | OUTPATIENT
Start: 2023-08-16

## 2023-08-16 RX ORDER — DIPHENHYDRAMINE HYDROCHLORIDE 50 MG/ML
12.5 INJECTION INTRAMUSCULAR; INTRAVENOUS
Status: CANCELLED | OUTPATIENT
Start: 2023-08-16 | End: 2023-08-17

## 2023-08-16 RX ORDER — SODIUM CHLORIDE 9 MG/ML
INJECTION, SOLUTION INTRAVENOUS PRN
Status: CANCELLED | OUTPATIENT
Start: 2023-08-16

## 2023-08-16 RX ORDER — SODIUM CHLORIDE, SODIUM LACTATE, POTASSIUM CHLORIDE, CALCIUM CHLORIDE 600; 310; 30; 20 MG/100ML; MG/100ML; MG/100ML; MG/100ML
INJECTION, SOLUTION INTRAVENOUS CONTINUOUS
Status: DISCONTINUED | OUTPATIENT
Start: 2023-08-16 | End: 2023-08-16 | Stop reason: HOSPADM

## 2023-08-16 RX ORDER — FENTANYL CITRATE 50 UG/ML
25 INJECTION, SOLUTION INTRAMUSCULAR; INTRAVENOUS EVERY 5 MIN PRN
Status: CANCELLED | OUTPATIENT
Start: 2023-08-16

## 2023-08-16 RX ORDER — MIDAZOLAM HYDROCHLORIDE 1 MG/ML
INJECTION INTRAMUSCULAR; INTRAVENOUS PRN
Status: DISCONTINUED | OUTPATIENT
Start: 2023-08-16 | End: 2023-08-16 | Stop reason: SDUPTHER

## 2023-08-16 RX ORDER — FENTANYL CITRATE 50 UG/ML
INJECTION, SOLUTION INTRAMUSCULAR; INTRAVENOUS PRN
Status: DISCONTINUED | OUTPATIENT
Start: 2023-08-16 | End: 2023-08-16 | Stop reason: SDUPTHER

## 2023-08-16 RX ORDER — PHENYLEPHRINE HCL 2.5 %
1 DROPS OPHTHALMIC (EYE)
Status: COMPLETED | OUTPATIENT
Start: 2023-08-16 | End: 2023-08-16

## 2023-08-16 RX ADMIN — PHENYLEPHRINE HYDROCHLORIDE 1 DROP: 2.5 SOLUTION/ DROPS OPHTHALMIC at 08:36

## 2023-08-16 RX ADMIN — TROPICAMIDE 1 DROP: 10 SOLUTION/ DROPS OPHTHALMIC at 08:26

## 2023-08-16 RX ADMIN — TROPICAMIDE 1 DROP: 10 SOLUTION/ DROPS OPHTHALMIC at 08:41

## 2023-08-16 RX ADMIN — TROPICAMIDE 1 DROP: 10 SOLUTION/ DROPS OPHTHALMIC at 08:31

## 2023-08-16 RX ADMIN — OFLOXACIN 1 DROP: 3 SOLUTION OPHTHALMIC at 08:26

## 2023-08-16 RX ADMIN — FENTANYL CITRATE 50 MCG: 50 INJECTION, SOLUTION INTRAMUSCULAR; INTRAVENOUS at 10:05

## 2023-08-16 RX ADMIN — LIDOCAINE HYDROCHLORIDE: 35 GEL OPHTHALMIC at 08:41

## 2023-08-16 RX ADMIN — PHENYLEPHRINE HYDROCHLORIDE 1 DROP: 2.5 SOLUTION/ DROPS OPHTHALMIC at 08:31

## 2023-08-16 RX ADMIN — PHENYLEPHRINE HYDROCHLORIDE 1 DROP: 2.5 SOLUTION/ DROPS OPHTHALMIC at 08:41

## 2023-08-16 RX ADMIN — SODIUM CHLORIDE, POTASSIUM CHLORIDE, SODIUM LACTATE AND CALCIUM CHLORIDE: 600; 310; 30; 20 INJECTION, SOLUTION INTRAVENOUS at 08:37

## 2023-08-16 RX ADMIN — PHENYLEPHRINE HYDROCHLORIDE 1 DROP: 2.5 SOLUTION/ DROPS OPHTHALMIC at 08:26

## 2023-08-16 RX ADMIN — MIDAZOLAM 2 MG: 1 INJECTION INTRAMUSCULAR; INTRAVENOUS at 09:58

## 2023-08-16 RX ADMIN — TROPICAMIDE 1 DROP: 10 SOLUTION/ DROPS OPHTHALMIC at 08:36

## 2023-08-16 RX ADMIN — FENTANYL CITRATE 50 MCG: 50 INJECTION, SOLUTION INTRAMUSCULAR; INTRAVENOUS at 10:00

## 2023-08-16 ASSESSMENT — PAIN DESCRIPTION - PAIN TYPE: TYPE: SURGICAL PAIN

## 2023-08-16 ASSESSMENT — PAIN SCALES - GENERAL
PAINLEVEL_OUTOF10: 1
PAINLEVEL_OUTOF10: 1
PAINLEVEL_OUTOF10: 0

## 2023-08-16 ASSESSMENT — PAIN - FUNCTIONAL ASSESSMENT
PAIN_FUNCTIONAL_ASSESSMENT: ACTIVITIES ARE NOT PREVENTED
PAIN_FUNCTIONAL_ASSESSMENT: 0-10

## 2023-08-16 ASSESSMENT — PAIN DESCRIPTION - DESCRIPTORS: DESCRIPTORS: ITCHING

## 2023-08-16 ASSESSMENT — PAIN DESCRIPTION - LOCATION
LOCATION: EYE
LOCATION: EYE

## 2023-08-16 NOTE — DISCHARGE INSTRUCTIONS
Post-Operative Cataract Instructions  [facility] INVASIVE SURGERY [facility]  Jacqueline Truong M.D. Dave Baumann. Joanne Hernandez M.D.  [facility] [address], [address]  [phone]      Diet  Resume normal diet. Do not drink alcoholic beverages, including beer for 24 hours. Activity  Do not drive a car or operate any hazardous machinery the day of surgery. You may resume normal activities as tolerated. No bending or heavy lifting. No reading or computer work after your surgery. You may watch TV. Wound Care  Anticipate that your eye will tear and water. You may also experience a sensation of a foreign body, sand, or grit in the surgical eye, this is normal.  Do not touch the affected eye.  ** Do not remove eye shield unless directed to do so by your physician. **  Wear eye shield when resting or sleeping for one week. Medications  Take Tylenol Extra Strength two (2) tablets by mouth upon arrival home and then every four (4) hours as needed for discomfort. Regarding Eye drops:  -  Begin using your eye drops as directed by your physician in the (left) operative eye. One drop of     []   Zymar    []  Vigamox   along with one (1) drop     []  Acular    []  Voltaren   every four (4) hours while awake. Wait five (5) minutes then apply one (1) drop     []  Pred Forte    []  Econopred Plus   every four (4) hours while awake. If you take glaucoma medications, continue to do so unless the physician otherwise instructs you. You may use artificial tears as needed if your eye feels scratchy. Notify your Physician Immediately for any of the following:  Excessive pain not relieved by Tylenol especially headache or increasing pressure to the operative eye. Persistent nausea lasting more than eight hours. If any vomiting occurs. If any questions or concerns arise, call your Surgeon at [phone].        DISCHARGE SUMMARY from Nurse    PATIENT INSTRUCTIONS:    After general

## 2023-08-16 NOTE — OP NOTE
Cataract Operative Note      Patient: Jan Bellamy               Sex: female          DOA: 8/16/2023         YOB: 1980      Age:  44 y.o.        LOS:  LOS: 0 days     Preoperative Diagnosis: Cataract left eye    Postoperative Diagnosis:  Cataract  left eye  Surgeon: Abeba Costa MD, M.D. Anesthesia:  Topical anesthesia  Procedure:  Phacoemulsification of posterior chamber for intraocular lens implantation left    Fluids:  0    Procedure in Detail: The operative eye was prepped and draped in the usual fashion. A lid speculum was placed in the operative eye. A clear cornea approach was utilized. A paracentesis incision(s) was constructed with a 1 mm slit knife. One percent preservative-free lidocaine followed by viscoelastic was instilled into the anterior chamber. A clear corneal incision was made with a slit knife. A continuous curvilinear capsulorrhexis was constructed followed by hydrodissection. A phacoemulsification tip was placed into the eye, and the lens nucleus was emulsified. The irrigation/aspiration device was then used to remove any remaining cortical material.  Polishing of the capsule was performed as needed. The intraocular lens was then placed into the capsular bag after it was re-inflated with viscoelastic. The remaining viscoelastic was then removed using the irrigation/aspiration device. BSS on a cannula was then used to hydrate the wound edges. At the end of the procedure the wound was found to be watertight, the anterior chamber was deep and the pupil round. No blood loss during surgery. An antibiotic and anti-inflammatroy was placed into the operative eye. The lid speculum was removed. Protective sunglasses were then placed onto the patient. The patient was taken to the [address] [place] [address] Unit (PACU) in good condition having tolerated the procedure well. Estimated Blood Loss: 0                 Implants:   Implant Name Type Inv.  Item Serial No.

## 2023-08-16 NOTE — ANESTHESIA POSTPROCEDURE EVALUATION
Department of Anesthesiology  Postprocedure Note    Patient: Jossie Marcelo  MRN: [id number]  YOB: 1980  Date of evaluation: 8/16/2023      Procedure Summary     Date: 08/16/23 Room / Location: [facility] 02 / [facility] MAIN OR    Anesthesia Start: 9101 Anesthesia Stop: 1613    Procedure: CATARACT EXTRACTION WITH INTRAOCULAR LENS IMPLANT LEFT EYE (Left: Eye) Diagnosis:       Cataract, nuclear sclerotic, left eye      (Cataract, nuclear sclerotic, left eye [H25.12])    Surgeons: Marylu Kate MD Responsible Provider: Neyda Mcclure MD    Anesthesia Type: MAC ASA Status: 3          Anesthesia Type: No value filed.     Nathalie Phase I:      Nathalie Phase II:        Anesthesia Post Evaluation    Patient location during evaluation: bedside  Patient participation: complete - patient participated  Level of consciousness: awake and alert  Pain score: 0  Airway patency: patent  Nausea & Vomiting: no nausea and no vomiting  Complications: no  Cardiovascular status: blood pressure returned to baseline  Respiratory status: acceptable and room air  Hydration status: euvolemic  Comments: Done by Alexei Lora CRNA  Pain management: adequate

## 2023-08-16 NOTE — INTERVAL H&P NOTE
Update History & Physical    The patient's History and Physical of August 16, 2023 was reviewed with the patient and I examined the patient. There was no change. The surgical site was confirmed by the patient and me. Plan: The risks, benefits, expected outcome, and alternative to the recommended procedure have been discussed with the patient. Patient understands and wants to proceed with the procedure.      Electronically signed by Leeanne Da Silva MD on 8/16/2023 at 9:11 AM

## 2023-08-16 NOTE — PERIOP NOTE
TRANSFER - IN REPORT:    Verbal report received from chasidy berumen on [employer]  being received from or for routine post-op      Report consisted of patient's Situation, Background, Assessment and   Recommendations(SBAR). Information from the following report(s) Nurse Handoff Report was reviewed with the receiving nurse. Opportunity for questions and clarification was provided. Assessment completed upon patient's arrival to unit and care assumed.

## 2023-08-25 NOTE — H&P
Measured by  Time Measured by   4:40 PM Lissette Worthy         Physical Exam  Exam Findings Details   Constitutional Normal No acute distress. Well nourished. Well developed. Respiratory Normal Effort - Normal.   Psychiatric Normal Orientation - Oriented to time, place, person & situation. Appropriate mood and affect. Assessment/Plan  # Detail Type Description    1. Assessment Other specified abnormal uterine bleeding (N93.8). Patient Plan Menses are q 27-35d, occ clots and cramping. I d/w patient possible etiologies, evaluation and management. TVUS reviewed w/ the patient. Q/A. She opts for [facility] D&C polypectomy. 7/19/23 TT --> No new c/o. Perc; ERx. [facility] D&C polypectomy. 8/14/23 TT  -TVUS (7/11/23): AV uterus (5.4 x 4.3 x 3. 5 cm); ET (13mm) w/ at least 3 polyps (2 of them 1 cm); ovaries wnl b/l.         2. Assessment Secondary dysmenorrhea (N94.5). Patient Plan Mild-mod: see above. 7/19/23 TT         3. Assessment Polyp of corpus uteri (N84.0). Patient Plan See above. 7/19/23 TT         4. Assessment Family history of malignant neoplasm of ovary (Z80.41). Patient Plan Known FHx. Q/A. Obtain genetic counseling / testing w/ Dr. Luz NUNEZ. 7/3/23 TT --> Genetic counseling on 11/15. 7/19/23 TT         5. Assessment Family history of breast cancer (Z80.3). Patient Plan See above. 6. Assessment Acute DVT of left distal leg (I82.4Z2). Patient Plan New dx (11/2022)on Eloquis. 7/3/23 TT         7. Assessment Postprocedural hypothyroidism (E89.0). Patient Plan Cont meds and close f/u w/ PCP. 7/3/23 TT         8. Assessment Body mass index (BMI) 34.0-34.9, adult (Z68.34). Patient Plan I emphasized the increased risks for continued estrogen production by her peripheral adipose tissue converting, via aromatization, adrenal androgens into estradiol. Not to mention, increased risks for DM, HTN, hyperlipidemia, CAD, decreased quality of life and shorter life expectancy. Encouraged healthy diet, exercise and lifestyle changes. Reviewed BMI and encouraged 10% weight loss over the next year. Recommend f/u q 3 mo, or as needed. 7/3/23 TT    [address] D&C polypectomy. 8/14/23 TT    R/B/A d/w patient. She understands that she is at increased risk for, but not limited to, infection, bleeding, blood transfusion, and/or damage to neighboring organs. All questions answered at this time. Medications (Added, Continued or Stopped today)  Start Date Medication Directions PRN Status PRN Reason Instruction Stop Date   09/02/2020 CellCept 500 mg tablet take 1 Tablet by oral route 2 times every day N   01/21/2028 12/23/2022 Eliquis 5 mg tablet take 1 tablet by oral route 2 times every day N      02/28/2018 levothyroxine 88 mcg tablet TAKE 1 TABLET BY MOUTH EVERY DAY N      09/02/2020 Neoral 25 mg capsule take 2 (2.5MG/KG)  by oral route 2 times every day N   01/21/2028 08/14/2023 oxycodone-acetaminophen 5 mg-325 mg tablet take 1 - 2 tablet by oral route  every 4 - 6 hours as needed; do not exceed 6 tabs in 24 hr N      09/02/2020 pantoprazole 40 mg tablet,delayed release take 1 tablet by oral route  every day N        Prescription Drug Monitoring Report: Accessed by Michael Floyd. Shameka Cheng MD on 8/14/2023 4:52:45 PM      Active Patient Care Team Members  Name Contact Agency Type Support Role Relationship Active Date Inactive Date Specialty   Saray Smart   Patient provider PCP   Family Practice   Archer Lennox   specialist       Christine Licea   encounter provider    Endocrinology   Amadeo Brochure   encounter provider    Neurology     Provider:    Vasile Magaña MD 08/14/2023 4:57 PM     Document generated by: Paddy Johns 08/14/2023 04:57 PM      ----------------------------------------------------------------------------------------------------------------------------------------------------------------------      Electronically signed by Michael Cheng MD on 08/18/2023 12:41 PM

## 2023-08-31 ENCOUNTER — ANESTHESIA EVENT (OUTPATIENT)
Facility: HOSPITAL | Age: 43
End: 2023-08-31
Payer: COMMERCIAL

## 2023-09-01 ENCOUNTER — ANESTHESIA (OUTPATIENT)
Facility: HOSPITAL | Age: 43
End: 2023-09-01
Payer: COMMERCIAL

## 2023-09-01 ENCOUNTER — HOSPITAL ENCOUNTER (OUTPATIENT)
Facility: HOSPITAL | Age: 43
Setting detail: OUTPATIENT SURGERY
Discharge: HOME | End: 2023-09-01
Attending: OBSTETRICS & GYNECOLOGY | Admitting: OBSTETRICS & GYNECOLOGY
Payer: COMMERCIAL

## 2023-09-01 VITALS
RESPIRATION RATE: 14 BRPM | HEIGHT: 62 IN | OXYGEN SATURATION: 100 % | TEMPERATURE: 97 F | WEIGHT: 199.1 LBS | DIASTOLIC BLOOD PRESSURE: 70 MMHG | HEART RATE: 59 BPM | SYSTOLIC BLOOD PRESSURE: 107 MMHG | BODY MASS INDEX: 36.64 KG/M2

## 2023-09-01 PROBLEM — N93.9 ABNORMAL UTERINE BLEEDING (AUB): Chronic | Status: ACTIVE | Noted: 2023-09-01

## 2023-09-01 PROBLEM — N84.1 POLYP OF CERVIX UTERI: Chronic | Status: RESOLVED | Noted: 2023-09-01 | Resolved: 2023-09-01

## 2023-09-01 PROBLEM — N84.1 POLYP OF CERVIX UTERI: Chronic | Status: ACTIVE | Noted: 2023-09-01

## 2023-09-01 LAB — HCG UR QL: NEGATIVE

## 2023-09-01 PROCEDURE — 7100000011 HC PHASE II RECOVERY - ADDTL 15 MIN: Performed by: OBSTETRICS & GYNECOLOGY

## 2023-09-01 PROCEDURE — 3600000002 HC SURGERY LEVEL 2 BASE: Performed by: OBSTETRICS & GYNECOLOGY

## 2023-09-01 PROCEDURE — 6360000002 HC RX W HCPCS: Performed by: OBSTETRICS & GYNECOLOGY

## 2023-09-01 PROCEDURE — 2500000003 HC RX 250 WO HCPCS: Performed by: OBSTETRICS & GYNECOLOGY

## 2023-09-01 PROCEDURE — 6360000002 HC RX W HCPCS

## 2023-09-01 PROCEDURE — A4217 STERILE WATER/SALINE, 500 ML: HCPCS | Performed by: OBSTETRICS & GYNECOLOGY

## 2023-09-01 PROCEDURE — 88305 TISSUE EXAM BY PATHOLOGIST: CPT

## 2023-09-01 PROCEDURE — 2500000003 HC RX 250 WO HCPCS

## 2023-09-01 PROCEDURE — 3700000001 HC ADD 15 MINUTES (ANESTHESIA): Performed by: OBSTETRICS & GYNECOLOGY

## 2023-09-01 PROCEDURE — 7100000001 HC PACU RECOVERY - ADDTL 15 MIN: Performed by: OBSTETRICS & GYNECOLOGY

## 2023-09-01 PROCEDURE — 2709999900 HC NON-CHARGEABLE SUPPLY: Performed by: OBSTETRICS & GYNECOLOGY

## 2023-09-01 PROCEDURE — 6360000002 HC RX W HCPCS: Performed by: ANESTHESIOLOGY

## 2023-09-01 PROCEDURE — 2580000003 HC RX 258: Performed by: OBSTETRICS & GYNECOLOGY

## 2023-09-01 PROCEDURE — 81025 URINE PREGNANCY TEST: CPT

## 2023-09-01 PROCEDURE — 3700000000 HC ANESTHESIA ATTENDED CARE: Performed by: OBSTETRICS & GYNECOLOGY

## 2023-09-01 PROCEDURE — 7100000010 HC PHASE II RECOVERY - FIRST 15 MIN: Performed by: OBSTETRICS & GYNECOLOGY

## 2023-09-01 PROCEDURE — 3600000012 HC SURGERY LEVEL 2 ADDTL 15MIN: Performed by: OBSTETRICS & GYNECOLOGY

## 2023-09-01 PROCEDURE — 7100000000 HC PACU RECOVERY - FIRST 15 MIN: Performed by: OBSTETRICS & GYNECOLOGY

## 2023-09-01 RX ORDER — FENTANYL CITRATE 50 UG/ML
INJECTION, SOLUTION INTRAMUSCULAR; INTRAVENOUS PRN
Status: DISCONTINUED | OUTPATIENT
Start: 2023-09-01 | End: 2023-09-01 | Stop reason: SDUPTHER

## 2023-09-01 RX ORDER — SODIUM CHLORIDE 0.9 % (FLUSH) 0.9 %
5-40 SYRINGE (ML) INJECTION PRN
Status: DISCONTINUED | OUTPATIENT
Start: 2023-09-01 | End: 2023-09-01 | Stop reason: HOSPADM

## 2023-09-01 RX ORDER — LIDOCAINE HYDROCHLORIDE 20 MG/ML
INJECTION, SOLUTION EPIDURAL; INFILTRATION; INTRACAUDAL; PERINEURAL PRN
Status: DISCONTINUED | OUTPATIENT
Start: 2023-09-01 | End: 2023-09-01 | Stop reason: SDUPTHER

## 2023-09-01 RX ORDER — METOCLOPRAMIDE HYDROCHLORIDE 5 MG/ML
10 INJECTION INTRAMUSCULAR; INTRAVENOUS
Status: DISCONTINUED | OUTPATIENT
Start: 2023-09-01 | End: 2023-09-01 | Stop reason: HOSPADM

## 2023-09-01 RX ORDER — BUPIVACAINE HYDROCHLORIDE 2.5 MG/ML
INJECTION, SOLUTION EPIDURAL; INFILTRATION; INTRACAUDAL PRN
Status: DISCONTINUED | OUTPATIENT
Start: 2023-09-01 | End: 2023-09-01 | Stop reason: ALTCHOICE

## 2023-09-01 RX ORDER — MIDAZOLAM HYDROCHLORIDE 1 MG/ML
INJECTION INTRAMUSCULAR; INTRAVENOUS PRN
Status: DISCONTINUED | OUTPATIENT
Start: 2023-09-01 | End: 2023-09-01 | Stop reason: SDUPTHER

## 2023-09-01 RX ORDER — HYDROMORPHONE HYDROCHLORIDE 1 MG/ML
0.5 INJECTION, SOLUTION INTRAMUSCULAR; INTRAVENOUS; SUBCUTANEOUS EVERY 5 MIN PRN
Status: DISCONTINUED | OUTPATIENT
Start: 2023-09-01 | End: 2023-09-01 | Stop reason: HOSPADM

## 2023-09-01 RX ORDER — FENTANYL CITRATE 50 UG/ML
25 INJECTION, SOLUTION INTRAMUSCULAR; INTRAVENOUS EVERY 5 MIN PRN
Status: COMPLETED | OUTPATIENT
Start: 2023-09-01 | End: 2023-09-01

## 2023-09-01 RX ORDER — DEXAMETHASONE SODIUM PHOSPHATE 4 MG/ML
INJECTION, SOLUTION INTRA-ARTICULAR; INTRALESIONAL; INTRAMUSCULAR; INTRAVENOUS; SOFT TISSUE PRN
Status: DISCONTINUED | OUTPATIENT
Start: 2023-09-01 | End: 2023-09-01 | Stop reason: SDUPTHER

## 2023-09-01 RX ORDER — ONDANSETRON 2 MG/ML
INJECTION INTRAMUSCULAR; INTRAVENOUS PRN
Status: DISCONTINUED | OUTPATIENT
Start: 2023-09-01 | End: 2023-09-01 | Stop reason: SDUPTHER

## 2023-09-01 RX ORDER — GLYCOPYRROLATE 0.2 MG/ML
INJECTION INTRAMUSCULAR; INTRAVENOUS PRN
Status: DISCONTINUED | OUTPATIENT
Start: 2023-09-01 | End: 2023-09-01 | Stop reason: SDUPTHER

## 2023-09-01 RX ORDER — PROPOFOL 10 MG/ML
INJECTION, EMULSION INTRAVENOUS PRN
Status: DISCONTINUED | OUTPATIENT
Start: 2023-09-01 | End: 2023-09-01 | Stop reason: SDUPTHER

## 2023-09-01 RX ORDER — SODIUM CHLORIDE 0.9 % (FLUSH) 0.9 %
5-40 SYRINGE (ML) INJECTION EVERY 12 HOURS SCHEDULED
Status: DISCONTINUED | OUTPATIENT
Start: 2023-09-01 | End: 2023-09-01 | Stop reason: HOSPADM

## 2023-09-01 RX ORDER — SODIUM CHLORIDE, SODIUM LACTATE, POTASSIUM CHLORIDE, CALCIUM CHLORIDE 600; 310; 30; 20 MG/100ML; MG/100ML; MG/100ML; MG/100ML
INJECTION, SOLUTION INTRAVENOUS CONTINUOUS
Status: DISCONTINUED | OUTPATIENT
Start: 2023-09-01 | End: 2023-09-01 | Stop reason: HOSPADM

## 2023-09-01 RX ORDER — SODIUM CHLORIDE 9 MG/ML
INJECTION, SOLUTION INTRAVENOUS PRN
Status: DISCONTINUED | OUTPATIENT
Start: 2023-09-01 | End: 2023-09-01 | Stop reason: HOSPADM

## 2023-09-01 RX ADMIN — ONDANSETRON HYDROCHLORIDE 4 MG: 2 INJECTION INTRAMUSCULAR; INTRAVENOUS at 11:34

## 2023-09-01 RX ADMIN — PROPOFOL 150 MG: 10 INJECTION, EMULSION INTRAVENOUS at 11:28

## 2023-09-01 RX ADMIN — DEXAMETHASONE SODIUM PHOSPHATE 8 MG: 4 INJECTION, SOLUTION INTRAMUSCULAR; INTRAVENOUS at 11:34

## 2023-09-01 RX ADMIN — FENTANYL CITRATE 25 MCG: 50 INJECTION, SOLUTION INTRAMUSCULAR; INTRAVENOUS at 12:16

## 2023-09-01 RX ADMIN — FENTANYL CITRATE 25 MCG: 50 INJECTION, SOLUTION INTRAMUSCULAR; INTRAVENOUS at 12:09

## 2023-09-01 RX ADMIN — PROPOFOL 30 MG: 10 INJECTION, EMULSION INTRAVENOUS at 11:44

## 2023-09-01 RX ADMIN — SODIUM CHLORIDE, POTASSIUM CHLORIDE, SODIUM LACTATE AND CALCIUM CHLORIDE: 600; 310; 30; 20 INJECTION, SOLUTION INTRAVENOUS at 09:40

## 2023-09-01 RX ADMIN — FENTANYL CITRATE 100 MCG: 50 INJECTION, SOLUTION INTRAMUSCULAR; INTRAVENOUS at 11:24

## 2023-09-01 RX ADMIN — LIDOCAINE HYDROCHLORIDE 100 MG: 20 INJECTION, SOLUTION EPIDURAL; INFILTRATION; INTRACAUDAL; PERINEURAL at 11:28

## 2023-09-01 RX ADMIN — GLYCOPYRROLATE 0.2 MG: 0.2 INJECTION, SOLUTION INTRAMUSCULAR; INTRAVENOUS at 11:20

## 2023-09-01 RX ADMIN — MIDAZOLAM 2 MG: 1 INJECTION INTRAMUSCULAR; INTRAVENOUS at 11:20

## 2023-09-01 ASSESSMENT — PAIN DESCRIPTION - DESCRIPTORS
DESCRIPTORS: CRAMPING

## 2023-09-01 ASSESSMENT — PAIN SCALES - GENERAL
PAINLEVEL_OUTOF10: 2
PAINLEVEL_OUTOF10: 0
PAINLEVEL_OUTOF10: 6
PAINLEVEL_OUTOF10: 4
PAINLEVEL_OUTOF10: 4
PAINLEVEL_OUTOF10: 5

## 2023-09-01 ASSESSMENT — PAIN DESCRIPTION - LOCATION
LOCATION: ABDOMEN

## 2023-09-01 ASSESSMENT — LIFESTYLE VARIABLES: SMOKING_STATUS: 0

## 2023-09-01 ASSESSMENT — PAIN - FUNCTIONAL ASSESSMENT: PAIN_FUNCTIONAL_ASSESSMENT: 0-10

## 2023-09-01 NOTE — OP NOTE
[facility] [place]  OPERATIVE REPORT    Name:  Saeed Gray  MR#:   [id number]  :  1980  ACCOUNT #:  [de-identified]  DATE OF SERVICE:  2023    PREOPERATIVE DIAGNOSES:  1. Abnormal uterine bleeding. 2.  Secondary dysmenorrhea. 3.  Endometrial polyps. 4.  Obesity (body mass index of 36). POSTOPERATIVE DIAGNOSES:  1. Abnormal uterine bleeding. 2.  Secondary dysmenorrhea. 3.  Endometrial polyps. 4.  Obesity (body mass index of 36). PROCEDURE PERFORMED:  Hysteroscopy D and C, polypectomy. SURGEON:  Yecenia Canela MD    ASSISTANT:  Duncan Alford. ANESTHESIA:  General and paracervical block. ANESTHESIOLOGIST:  Heidy Cantu. Arabella Ramos MD    COMPLICATIONS:  None. SPECIMENS REMOVED:  ***    IMPLANTS:  None. ESTIMATED BLOOD LOSS:  Minimal.    IV FLUIDS:  800 mL of lactated Ringer's. FINDINGS:  Anteverted uterus sounding to 10 cm with multiple polyps noted within the endometrial cavity in the lower uterine segment portion, one extending from 12 to 1 o'clock, another from 5 to 8 o'clock, and another at 10 o'clock. Final look revealing removal of these abnormalities entirely. INDICATIONS:  This is a 44year-old with complaints of abnormal uterine menses 27 to 35 years apart with occasional clots and cramping. She had transvaginal ultrasound on 2023 demonstrating anteverted uterus 5.4 x 4.3 x 2.5 cm, endometrial thickness 13 mm with at least three polyps, two of them at least 1 cm. Ovaries normal bilaterally. Findings reviewed with the patient. Risks, benefits, and alternatives were discussed and she opted for surgical management as stated above. All questions were answered prior to surgical start time. PROCEDURE:  The patient was taken to the OR where general anesthesia was obtained without difficulty. She was prepared and draped in usual sterile fashion in dorsal lithotomy position with legs in stirrups.   A weighted speculum was placed in the vagina and Meza retractor in the anterior fornix to expose the cervix. A paracervical block was performed using 20 mL of 0.25% Marcaine plain. The cervical os was gradually dilated to allow introduction of the hysteroscope. This was advanced into the uterus under direct visualization with the water running. The above findings were noted. The polypectomy was then performed with a combination of Daljit Stone forces, hysteroscopic graspers, and followed by gentle curettage until a gritty texture was noted throughout. Several passes were made with final look revealed removal of all the polyps entirely and an overall smooth endometrial cavity at the end of case. All instruments were then removed. Excellent hemostasis assured at the end of the case. The patient tolerated the procedure well. Sponge, lap, needle, and instrument counts were correct x2. She was taken to the recovery area in stable condition.       Billie Romano MD      [id number]  D:  09/01/2023 12:15  T:  09/01/2023 16:43  JOB #:  [id number]

## 2023-09-01 NOTE — PERIOP NOTE
TRANSFER - IN REPORT:    Verbal report received from OR Nurse and CRNA on [facility]  being received from OR for routine post-op      Report consisted of patient's Situation, Background, Assessment and   Recommendations(SBAR). Information from the following report(s) Nurse Handoff Report, Adult Overview, Surgery Report, Intake/Output, MAR, and Recent Results was reviewed with the receiving nurse. Opportunity for questions and clarification was provided. Assessment completed upon patient's arrival to unit and care assumed.

## 2023-09-01 NOTE — ANESTHESIA POSTPROCEDURE EVALUATION
Department of Anesthesiology  Postprocedure Note    Patient: Latoya Martinez  MRN: [id number]  YOB: 1980  Date of evaluation: 9/1/2023      Procedure Summary     Date: 09/01/23 Room / Location: THE [facility] 04 / [facility] OR    Anesthesia Start: 1120 Anesthesia Stop: 6338    Procedure: HYSTEROSCOPY, DILATATION AND CURETTAGE POLYPECTOMY \"SPEC POP\" (Uterus) Diagnosis:       Polyp of corpus uteri      (Polyp of corpus uteri [N84.0])    Surgeons: Noreen Junior MD Responsible Provider: Michael Arias MD    Anesthesia Type: General ASA Status: 2          Anesthesia Type: General    Nathalie Phase I: Nathalie Score: 10    Nathalie Phase II: Nathalie Score: 10      Anesthesia Post Evaluation    Patient location during evaluation: PACU  Patient participation: complete - patient participated  Level of consciousness: awake and alert  Pain score: 4  Airway patency: patent  Nausea & Vomiting: no nausea and no vomiting  Complications: no  Cardiovascular status: blood pressure returned to baseline  Respiratory status: acceptable  Hydration status: euvolemic

## 2023-09-01 NOTE — PERIOP NOTE
Reviewed PTA medication list with patient/caregiver and patient/caregiver denies any additional medications. Patient admits to having a responsible adult care for them at home for at least 24 hours after surgery. Patient encouraged to use gown warming system and informed that using said warming gown to regulate body temperature prior to a procedure has been shown to help reduce the risks of blood clots and infection. Patient's pharmacy of choice verified and documented in PTA medication section. Dual skin assessment & fall risk band verification completed with Aleyda Thompson. Urine pregnancy results negative and verified with Luann Miguel.

## 2023-09-01 NOTE — ANESTHESIA PRE PROCEDURE
POCHCT in the last 72 hours. Coags: No results found for: PROTIME, INR, APTT    HCG (If Applicable):   Lab Results   Component Value Date    PREGTESTUR Negative 09/01/2023        ABGs: No results found for: PHART, PO2ART, QVG5XEE, JLU5LRB, BEART, K5UFGOUT     Type & Screen (If Applicable):  No results found for: LABABO, LABRH    Drug/Infectious Status (If Applicable):  No results found for: HIV, HEPCAB    COVID-19 Screening (If Applicable): No results found for: COVID19        Anesthesia Evaluation  Patient summary reviewed and Nursing notes reviewed no history of anesthetic complications:   Airway: Mallampati: III  TM distance: >3 FB   Neck ROM: full  Mouth opening: > = 3 FB   Dental:          Pulmonary: breath sounds clear to auscultation      (-) sleep apnea and not a current smoker          Patient did not smoke on day of surgery. Cardiovascular:  Exercise tolerance: good (>4 METS),           Rhythm: regular  Rate: normal           Beta Blocker:  Not on Beta Blocker         Neuro/Psych:   Negative Neuro/Psych ROS              GI/Hepatic/Renal:   (+) GERD: well controlled, PUD,      (-) hiatal hernia       Endo/Other: Negative Endo/Other ROS                    Abdominal:   (+) obese,           Vascular: negative vascular ROS. Other Findings:           Anesthesia Plan      general     ASA 2       Induction: intravenous. MIPS: Postoperative opioids intended. Anesthetic plan and risks discussed with patient. Plan discussed with CRNA.                     Alxe Barcensa MD   9/1/2023

## 2023-09-01 NOTE — PERIOP NOTE
TRANSFER - OUT REPORT:    Verbal report given to [id number] on [employer]  being transferred to Phase 2 for routine post-op       Report consisted of patient's Situation, Background, Assessment and   Recommendations(SBAR). Information from the following report(s) Nurse Handoff Report, Adult Overview, Surgery Report, Intake/Output, MAR, and Recent Results was reviewed with the receiving nurse. Lines:   Peripheral IV 09/01/23 Left Hand (Active)   Site Assessment Clean, dry & intact 09/01/23 1219   Line Status Infusing 09/01/23 1210 [address] Connections checked and tightened 09/01/23 0938   Phlebitis Assessment No symptoms 09/01/23 1219   Infiltration Assessment 0 09/01/23 1219   Alcohol Cap Used No 09/01/23 0938   Dressing Status Clean, dry & intact 09/01/23 1219   Dressing Type Transparent 09/01/23 1219   Dressing Intervention New 09/01/23 7560        Opportunity for questions and clarification was provided.       Patient transported with:  Registered Nurse

## 2023-09-01 NOTE — INTERVAL H&P NOTE
Update History & Physical    The patient's History and Physical of August 14, 2023 was reviewed with the patient and I examined the patient. There was no change. The surgical site was confirmed by the patient and me. Plan: The risks, benefits, expected outcome, and alternative to the recommended procedure have been discussed with the patient. Patient understands and wants to proceed with the procedure.      Electronically signed by Maryellen Brown MD on 9/1/2023 at 9:46 AM

## 2023-09-01 NOTE — DISCHARGE INSTRUCTIONS
Follow all of Dr. Tab Mendoza instructions. Resume diet as tolerated. Pelvic rest for 2-3 weeks. Monitor bleeding. Notified office is soak more than 1 pad for 2 hours straight. DISCHARGE SUMMARY from Nurse    PATIENT INSTRUCTIONS:    After general anesthesia or intravenous sedation, for 24 hours or while taking prescription Narcotics:  Limit your activities  Do not drive and operate hazardous machinery  Do not make important personal or business decisions  Do  not drink alcoholic beverages  If you have not urinated within 8 hours after discharge, please contact your surgeon on call. Report the following to your surgeon:  Excessive pain, swelling, redness or odor of or around the surgical area  Temperature over 100.5  Nausea and vomiting lasting longer than 4 hours or if unable to take medications  Any signs of decreased circulation or nerve impairment to extremity: change in color, persistent  numbness, tingling, coldness or increase pain  Any questions    What to do at Home:  Recommended activity: activity as tolerated and no driving until advised by doctor. *  Please give a list of your current medications to your Primary Care Provider. *  Please update this list whenever your medications are discontinued, doses are      changed, or new medications (including over-the-counter products) are added. *  Please carry medication information at all times in case of emergency situations. These are general instructions for a healthy lifestyle:    No smoking/ No tobacco products/ Avoid exposure to second hand smoke  Surgeon General's Warning:  Quitting smoking now greatly reduces serious risk to your health.     Obesity, smoking, and sedentary lifestyle greatly increases your risk for illness    A healthy diet, regular physical exercise & weight monitoring are important for maintaining a healthy lifestyle    You may be retaining fluid if you have a history of heart failure or if you experience any of the

## (undated) DEVICE — Device

## (undated) DEVICE — 3M™ TEGADERM™ TRANSPARENT FILM DRESSING FRAME STYLE, 1624W, 2-3/8 IN X 2-3/4 IN (6 CM X 7 CM), 100/CT 4CT/CASE: Brand: 3M™ TEGADERM™

## (undated) DEVICE — CANNULA FRM 27GA 7 8IN

## (undated) DEVICE — SOLUTION IRRIGATION H2O 0797305] ICU MEDICAL INC]

## (undated) DEVICE — GARMENT,MEDLINE,DVT,INT,CALF,MED, GEN2: Brand: MEDLINE

## (undated) DEVICE — SOLUTION IV LACTATED RINGERS INJECTION USP

## (undated) DEVICE — TOWEL SURG W16XL26IN BLU NONFENESTRATED DLX ST 2 PER PK

## (undated) DEVICE — CYTOTOME IRRIG 25GA L16MM ANT CAP BENT

## (undated) DEVICE — STRAP,POSITIONING,KNEE/BODY,FOAM,4X60": Brand: MEDLINE

## (undated) DEVICE — GLOVE SURG SZ 65 THK91MIL LTX FREE SYN POLYISOPRENE

## (undated) DEVICE — SYSTEM FLD MGMT DIA0.9MM 45DEG ULT BAL VISN ACT INTREPID

## (undated) DEVICE — 3M™ BAIR PAWS FLEX™ WARMING GOWN, SMALL, 20 PER CASE 81103: Brand: BAIR PAWS™

## (undated) DEVICE — CLEARCUT® SLIT KNIFE INTREPID MICRO-COAXIAL SYSTEM 2.4 SB: Brand: CLEARCUT®; INTREPID

## (undated) DEVICE — SOLUTION IRRIGATION BAL SALT SOLUTION 500 ML STRL BSS

## (undated) DEVICE — D&C HYSTEROSCOPY: Brand: MEDLINE INDUSTRIES, INC.

## (undated) DEVICE — GLOVE ORANGE PI 7 1/2   MSG9075